# Patient Record
Sex: FEMALE | Race: WHITE | NOT HISPANIC OR LATINO
[De-identification: names, ages, dates, MRNs, and addresses within clinical notes are randomized per-mention and may not be internally consistent; named-entity substitution may affect disease eponyms.]

---

## 2017-05-03 ENCOUNTER — APPOINTMENT (OUTPATIENT)
Dept: SURGERY | Facility: CLINIC | Age: 72
End: 2017-05-03

## 2017-05-10 ENCOUNTER — APPOINTMENT (OUTPATIENT)
Dept: SURGERY | Facility: CLINIC | Age: 72
End: 2017-05-10

## 2017-06-15 ENCOUNTER — INPATIENT (INPATIENT)
Facility: HOSPITAL | Age: 72
LOS: 0 days | Discharge: HOME | End: 2017-06-16
Attending: HOSPITALIST | Admitting: HOSPITALIST
Payer: MEDICARE

## 2017-06-15 VITALS
HEART RATE: 71 BPM | RESPIRATION RATE: 17 BRPM | OXYGEN SATURATION: 96 % | DIASTOLIC BLOOD PRESSURE: 91 MMHG | TEMPERATURE: 98 F | SYSTOLIC BLOOD PRESSURE: 178 MMHG

## 2017-06-15 DIAGNOSIS — K59.09 OTHER CONSTIPATION: ICD-10-CM

## 2017-06-15 DIAGNOSIS — E03.9 HYPOTHYROIDISM, UNSPECIFIED: ICD-10-CM

## 2017-06-15 DIAGNOSIS — S12.100S: ICD-10-CM

## 2017-06-15 DIAGNOSIS — E83.52 HYPERCALCEMIA: ICD-10-CM

## 2017-06-15 DIAGNOSIS — I10 ESSENTIAL (PRIMARY) HYPERTENSION: ICD-10-CM

## 2017-06-15 DIAGNOSIS — M54.41 LUMBAGO WITH SCIATICA, RIGHT SIDE: ICD-10-CM

## 2017-06-15 LAB
ALBUMIN SERPL ELPH-MCNC: 3.9 G/DL — SIGNIFICANT CHANGE UP (ref 3.3–5)
ALP SERPL-CCNC: 94 U/L — SIGNIFICANT CHANGE UP (ref 40–120)
ALT FLD-CCNC: 16 U/L — SIGNIFICANT CHANGE UP (ref 4–33)
AST SERPL-CCNC: 31 U/L — SIGNIFICANT CHANGE UP (ref 4–32)
BASOPHILS # BLD AUTO: 0.04 K/UL — SIGNIFICANT CHANGE UP (ref 0–0.2)
BASOPHILS NFR BLD AUTO: 0.6 % — SIGNIFICANT CHANGE UP (ref 0–2)
BILIRUB SERPL-MCNC: 0.2 MG/DL — SIGNIFICANT CHANGE UP (ref 0.2–1.2)
BUN SERPL-MCNC: 48 MG/DL — HIGH (ref 7–23)
CA-I BLD-SCNC: 1.47 MMOL/L — HIGH (ref 1.03–1.23)
CALCIUM SERPL-MCNC: 12.4 MG/DL — HIGH (ref 8.4–10.5)
CHLORIDE SERPL-SCNC: 100 MMOL/L — SIGNIFICANT CHANGE UP (ref 98–107)
CO2 SERPL-SCNC: 28 MMOL/L — SIGNIFICANT CHANGE UP (ref 22–31)
CREAT SERPL-MCNC: 1.28 MG/DL — SIGNIFICANT CHANGE UP (ref 0.5–1.3)
EOSINOPHIL # BLD AUTO: 0.13 K/UL — SIGNIFICANT CHANGE UP (ref 0–0.5)
EOSINOPHIL NFR BLD AUTO: 1.8 % — SIGNIFICANT CHANGE UP (ref 0–6)
GLUCOSE SERPL-MCNC: 85 MG/DL — SIGNIFICANT CHANGE UP (ref 70–99)
HCT VFR BLD CALC: 34.6 % — SIGNIFICANT CHANGE UP (ref 34.5–45)
HGB BLD-MCNC: 11.1 G/DL — LOW (ref 11.5–15.5)
IMM GRANULOCYTES NFR BLD AUTO: 0 % — SIGNIFICANT CHANGE UP (ref 0–1.5)
LYMPHOCYTES # BLD AUTO: 2.38 K/UL — SIGNIFICANT CHANGE UP (ref 1–3.3)
LYMPHOCYTES # BLD AUTO: 33.7 % — SIGNIFICANT CHANGE UP (ref 13–44)
MCHC RBC-ENTMCNC: 28 PG — SIGNIFICANT CHANGE UP (ref 27–34)
MCHC RBC-ENTMCNC: 32.1 % — SIGNIFICANT CHANGE UP (ref 32–36)
MCV RBC AUTO: 87.4 FL — SIGNIFICANT CHANGE UP (ref 80–100)
MONOCYTES # BLD AUTO: 0.42 K/UL — SIGNIFICANT CHANGE UP (ref 0–0.9)
MONOCYTES NFR BLD AUTO: 5.9 % — SIGNIFICANT CHANGE UP (ref 2–14)
NEUTROPHILS # BLD AUTO: 4.09 K/UL — SIGNIFICANT CHANGE UP (ref 1.8–7.4)
NEUTROPHILS NFR BLD AUTO: 58 % — SIGNIFICANT CHANGE UP (ref 43–77)
PLATELET # BLD AUTO: 178 K/UL — SIGNIFICANT CHANGE UP (ref 150–400)
PMV BLD: 12.2 FL — SIGNIFICANT CHANGE UP (ref 7–13)
POTASSIUM SERPL-MCNC: 4.7 MMOL/L — SIGNIFICANT CHANGE UP (ref 3.5–5.3)
POTASSIUM SERPL-SCNC: 4.7 MMOL/L — SIGNIFICANT CHANGE UP (ref 3.5–5.3)
PROT SERPL-MCNC: 6.6 G/DL — SIGNIFICANT CHANGE UP (ref 6–8.3)
PTH-INTACT SERPL-MCNC: 10.17 PG/ML — LOW (ref 15–65)
RBC # BLD: 3.96 M/UL — SIGNIFICANT CHANGE UP (ref 3.8–5.2)
RBC # FLD: 14.6 % — HIGH (ref 10.3–14.5)
SODIUM SERPL-SCNC: 142 MMOL/L — SIGNIFICANT CHANGE UP (ref 135–145)
WBC # BLD: 7.06 K/UL — SIGNIFICANT CHANGE UP (ref 3.8–10.5)
WBC # FLD AUTO: 7.06 K/UL — SIGNIFICANT CHANGE UP (ref 3.8–10.5)

## 2017-06-15 PROCEDURE — 99223 1ST HOSP IP/OBS HIGH 75: CPT | Mod: AI

## 2017-06-15 RX ORDER — GABAPENTIN 400 MG/1
200 CAPSULE ORAL AT BEDTIME
Qty: 0 | Refills: 0 | Status: DISCONTINUED | OUTPATIENT
Start: 2017-06-15 | End: 2017-06-16

## 2017-06-15 RX ORDER — LEVOTHYROXINE SODIUM 125 MCG
88 TABLET ORAL DAILY
Qty: 0 | Refills: 0 | Status: DISCONTINUED | OUTPATIENT
Start: 2017-06-15 | End: 2017-06-16

## 2017-06-15 RX ORDER — BUPROPION HYDROCHLORIDE 150 MG/1
1 TABLET, EXTENDED RELEASE ORAL
Qty: 0 | Refills: 0 | COMMUNITY

## 2017-06-15 RX ORDER — HYDRALAZINE HCL 50 MG
10 TABLET ORAL EVERY 6 HOURS
Qty: 0 | Refills: 0 | Status: DISCONTINUED | OUTPATIENT
Start: 2017-06-15 | End: 2017-06-16

## 2017-06-15 RX ORDER — ACETAMINOPHEN 500 MG
650 TABLET ORAL ONCE
Qty: 0 | Refills: 0 | Status: COMPLETED | OUTPATIENT
Start: 2017-06-15 | End: 2017-06-15

## 2017-06-15 RX ORDER — SODIUM CHLORIDE 9 MG/ML
1000 INJECTION INTRAMUSCULAR; INTRAVENOUS; SUBCUTANEOUS ONCE
Qty: 0 | Refills: 0 | Status: COMPLETED | OUTPATIENT
Start: 2017-06-15 | End: 2017-06-15

## 2017-06-15 RX ORDER — GABAPENTIN 400 MG/1
2 CAPSULE ORAL
Qty: 0 | Refills: 0 | COMMUNITY

## 2017-06-15 RX ORDER — ALPRAZOLAM 0.25 MG
0.5 TABLET ORAL DAILY
Qty: 0 | Refills: 0 | Status: DISCONTINUED | OUTPATIENT
Start: 2017-06-15 | End: 2017-06-16

## 2017-06-15 RX ORDER — ENOXAPARIN SODIUM 100 MG/ML
40 INJECTION SUBCUTANEOUS EVERY 24 HOURS
Qty: 0 | Refills: 0 | Status: DISCONTINUED | OUTPATIENT
Start: 2017-06-15 | End: 2017-06-16

## 2017-06-15 RX ORDER — CALCITRIOL 0.5 UG/1
1 CAPSULE ORAL
Qty: 0 | Refills: 0 | COMMUNITY

## 2017-06-15 RX ORDER — SERTRALINE 25 MG/1
1 TABLET, FILM COATED ORAL
Qty: 0 | Refills: 0 | COMMUNITY

## 2017-06-15 RX ORDER — BUPROPION HYDROCHLORIDE 150 MG/1
150 TABLET, EXTENDED RELEASE ORAL DAILY
Qty: 0 | Refills: 0 | Status: DISCONTINUED | OUTPATIENT
Start: 2017-06-15 | End: 2017-06-16

## 2017-06-15 RX ORDER — LEVOTHYROXINE SODIUM 125 MCG
1 TABLET ORAL
Qty: 0 | Refills: 0 | COMMUNITY

## 2017-06-15 RX ORDER — ALPRAZOLAM 0.25 MG
1 TABLET ORAL
Qty: 0 | Refills: 0 | COMMUNITY

## 2017-06-15 RX ORDER — TERIPARATIDE 250 UG/ML
0 INJECTION, SOLUTION SUBCUTANEOUS
Qty: 0 | Refills: 0 | COMMUNITY

## 2017-06-15 RX ORDER — DIAZEPAM 5 MG
1 TABLET ORAL
Qty: 0 | Refills: 0 | COMMUNITY

## 2017-06-15 RX ORDER — CALCIUM CARBONATE 500(1250)
0 TABLET ORAL
Qty: 0 | Refills: 0 | COMMUNITY

## 2017-06-15 RX ORDER — SERTRALINE 25 MG/1
50 TABLET, FILM COATED ORAL DAILY
Qty: 0 | Refills: 0 | Status: DISCONTINUED | OUTPATIENT
Start: 2017-06-15 | End: 2017-06-16

## 2017-06-15 RX ORDER — SODIUM CHLORIDE 9 MG/ML
1000 INJECTION INTRAMUSCULAR; INTRAVENOUS; SUBCUTANEOUS
Qty: 0 | Refills: 0 | Status: DISCONTINUED | OUTPATIENT
Start: 2017-06-15 | End: 2017-06-16

## 2017-06-15 RX ORDER — CHOLECALCIFEROL (VITAMIN D3) 125 MCG
0 CAPSULE ORAL
Qty: 0 | Refills: 0 | COMMUNITY

## 2017-06-15 RX ADMIN — SODIUM CHLORIDE 125 MILLILITER(S): 9 INJECTION INTRAMUSCULAR; INTRAVENOUS; SUBCUTANEOUS at 18:05

## 2017-06-15 RX ADMIN — ENOXAPARIN SODIUM 40 MILLIGRAM(S): 100 INJECTION SUBCUTANEOUS at 21:38

## 2017-06-15 RX ADMIN — Medication 10 MILLIGRAM(S): at 18:41

## 2017-06-15 RX ADMIN — SODIUM CHLORIDE 1000 MILLILITER(S): 9 INJECTION INTRAMUSCULAR; INTRAVENOUS; SUBCUTANEOUS at 16:13

## 2017-06-15 RX ADMIN — GABAPENTIN 200 MILLIGRAM(S): 400 CAPSULE ORAL at 21:38

## 2017-06-15 RX ADMIN — SODIUM CHLORIDE 1000 MILLILITER(S): 9 INJECTION INTRAMUSCULAR; INTRAVENOUS; SUBCUTANEOUS at 14:30

## 2017-06-15 RX ADMIN — Medication 650 MILLIGRAM(S): at 18:05

## 2017-06-15 NOTE — ED PROVIDER NOTE - MEDICAL DECISION MAKING DETAILS
72 y/o female w/ lethargy, muscle weakness and slowed speech and elevated calcium on blood work yesterday- repeat labs, reassess

## 2017-06-15 NOTE — ED ADULT NURSE REASSESSMENT NOTE - NS ED NURSE REASSESS COMMENT FT1
Patient resting comfortably in stretcher with son at bedside. C/O constipation and headache. Last BM was on Sunday. Patient reports usual BM's are every 2 days and takes OTC stool softeners daily. Patient remains to be hypertensive. Medicated as per orders with tylenol, hydralazine and dulcolax suppository. Patient admitted to medicine for hypercalcemia. Awaiting call back for report to [facility]. Call bell within reach and son remains at bedside.

## 2017-06-15 NOTE — ED ADULT TRIAGE NOTE - CHIEF COMPLAINT QUOTE
as per pts son pt has been experiencing extreme fatigue and slurred speech. was told by her PMD that her calcium was 13.5. speech noted to be delayed in triage.  dr Martel called to triage for stroke eval. no code stroke called

## 2017-06-15 NOTE — ED PROVIDER NOTE - OBJECTIVE STATEMENT
70 y/o female pmh hypothyroidism, goiter, breast ca sent in by PMD for increased calcium levels. As per son, pt has had increased lethargy, RUE weakness and difficulty speaking. Pt saw PMD x1 day ago and found to have hypercalcemia and sent to ER. Admits to slowness to speech w/ 70 y/o female pmh hypothyroidism, goiter, breast ca sent in by PMD for increased calcium levels. As per son, pt has had increased lethargy, RUE weakness and difficulty speaking. Pt saw PMD x1 day ago and found to have hypercalcemia and sent to ER. Admits to slowness to speech, worse today. Denies chest pain, sob, abd pain, n/v/d, numbness, tingling, dizziness, syncope, fever or chills. Denies decreased appetite or new meds. Louise slurred speech, facial droop, change in vision.

## 2017-06-15 NOTE — H&P ADULT - PROBLEM SELECTOR PLAN 3
stable. continue Neurontin, and Tylenol. Avoid narcotics since patient has an intolerance to them. Patient for laminectomy next month.

## 2017-06-15 NOTE — ED PROVIDER NOTE - ATTENDING CONTRIBUTION TO CARE
Attending note:   After face to face evaluation of this patient, I concur with above noted hx, pe, and care plan for this patient. +Hypercalcemia; pt on forteo; evaluation in progress.   +Alert, sluggish

## 2017-06-15 NOTE — H&P ADULT - PMH
Breast Cancer    Hyperparathyroidism    Nodular Goiter Breast Cancer    Chronic midline low back pain with right-sided sciatica    Hyperparathyroidism    Hypothyroidism, unspecified type    Nodular Goiter    Odontoid fracture, sequela

## 2017-06-15 NOTE — H&P ADULT - PROBLEM SELECTOR PLAN 2
Patient has no history of hypertension. Son says BPs are normally low. Will not start standing right now. Start  Hydralazine IV prn and trend  Bps. If BPs still elevated in a.m. will consider standing BP meds.

## 2017-06-15 NOTE — ED ADULT NURSE REASSESSMENT NOTE - NS ED NURSE REASSESS COMMENT FT1
Patient resting comfortably in stretcher. Improvement in BP noted  after hydralazine. Report given to RN for RM 562h. Awaiting transport.

## 2017-06-15 NOTE — H&P ADULT - ASSESSMENT
71 y.o. 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

## 2017-06-15 NOTE — H&P ADULT - GASTROINTESTINAL DETAILS
no bruit/no masses palpable/no rigidity/nontender/no guarding/no distention/soft/bowel sounds normal

## 2017-06-15 NOTE — ED ADULT NURSE NOTE - OBJECTIVE STATEMENT
Patient received in RM 14 A&Ox3 and ambulatory at baseline. Sent in by PMD for elevated calcium (13.5). Patient was being treated with forteo. Patient c/o fatigue RUQ weakness and worsening hesitancy when speaking. Denies chest pain, SOB, dizziness, palpitations, slurred speech, numbness, tingling, blurry vision. PE and hisotry as noted below.

## 2017-06-15 NOTE — H&P ADULT - HISTORY OF PRESENT ILLNESS
71 y.o. 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 Patient also c/o constipation x 4 days without abdominal pain or N/V. She has chronic LBP with radiation down right leg which she says is chronically ~ 6/10 in severity controlled with valium and neurontin. She is scheduled for another laminectomy next month. In ED SBP as high as 180s. Patient and son deny history of hypertension.

## 2017-06-15 NOTE — H&P ADULT - PROBLEM SELECTOR PLAN 1
Most likely secondary to Forteo. recent mammogram few months ago negative according to son.   monitor ionized calcium and phosphorus.  check TSH, free T4, , SPEP/UPEP, Vitamin D levels.  start NS @125ml/hr

## 2017-06-16 ENCOUNTER — TRANSCRIPTION ENCOUNTER (OUTPATIENT)
Age: 72
End: 2017-06-16

## 2017-06-16 VITALS
DIASTOLIC BLOOD PRESSURE: 85 MMHG | TEMPERATURE: 98 F | SYSTOLIC BLOOD PRESSURE: 155 MMHG | RESPIRATION RATE: 18 BRPM | OXYGEN SATURATION: 100 % | HEART RATE: 78 BPM

## 2017-06-16 DIAGNOSIS — E21.3 HYPERPARATHYROIDISM, UNSPECIFIED: ICD-10-CM

## 2017-06-16 DIAGNOSIS — C50.919 MALIGNANT NEOPLASM OF UNSPECIFIED SITE OF UNSPECIFIED FEMALE BREAST: ICD-10-CM

## 2017-06-16 DIAGNOSIS — R47.81 SLURRED SPEECH: ICD-10-CM

## 2017-06-16 LAB
24R-OH-CALCIDIOL SERPL-MCNC: 49.1 NG/ML — SIGNIFICANT CHANGE UP (ref 30–100)
ALBUMIN SERPL ELPH-MCNC: 3.2 G/DL — LOW (ref 3.3–5)
ALP SERPL-CCNC: 73 U/L — SIGNIFICANT CHANGE UP (ref 40–120)
ALT FLD-CCNC: 13 U/L — SIGNIFICANT CHANGE UP (ref 4–33)
AST SERPL-CCNC: 23 U/L — SIGNIFICANT CHANGE UP (ref 4–32)
BASOPHILS # BLD AUTO: 0.04 K/UL — SIGNIFICANT CHANGE UP (ref 0–0.2)
BASOPHILS NFR BLD AUTO: 0.6 % — SIGNIFICANT CHANGE UP (ref 0–2)
BILIRUB SERPL-MCNC: 0.2 MG/DL — SIGNIFICANT CHANGE UP (ref 0.2–1.2)
BUN SERPL-MCNC: 32 MG/DL — HIGH (ref 7–23)
CA-I BLD-SCNC: 1.36 MMOL/L — HIGH (ref 1.03–1.23)
CALCIUM SERPL-MCNC: 10.6 MG/DL — HIGH (ref 8.4–10.5)
CHLORIDE SERPL-SCNC: 109 MMOL/L — HIGH (ref 98–107)
CO2 SERPL-SCNC: 24 MMOL/L — SIGNIFICANT CHANGE UP (ref 22–31)
CREAT SERPL-MCNC: 1.02 MG/DL — SIGNIFICANT CHANGE UP (ref 0.5–1.3)
EOSINOPHIL # BLD AUTO: 0.33 K/UL — SIGNIFICANT CHANGE UP (ref 0–0.5)
EOSINOPHIL NFR BLD AUTO: 5.2 % — SIGNIFICANT CHANGE UP (ref 0–6)
GLUCOSE SERPL-MCNC: 84 MG/DL — SIGNIFICANT CHANGE UP (ref 70–99)
HCT VFR BLD CALC: 31.3 % — LOW (ref 34.5–45)
HGB BLD-MCNC: 9.8 G/DL — LOW (ref 11.5–15.5)
IMM GRANULOCYTES NFR BLD AUTO: 0.3 % — SIGNIFICANT CHANGE UP (ref 0–1.5)
LYMPHOCYTES # BLD AUTO: 2.91 K/UL — SIGNIFICANT CHANGE UP (ref 1–3.3)
LYMPHOCYTES # BLD AUTO: 45.8 % — HIGH (ref 13–44)
MAGNESIUM SERPL-MCNC: 1.5 MG/DL — LOW (ref 1.6–2.6)
MCHC RBC-ENTMCNC: 27.5 PG — SIGNIFICANT CHANGE UP (ref 27–34)
MCHC RBC-ENTMCNC: 31.3 % — LOW (ref 32–36)
MCV RBC AUTO: 87.7 FL — SIGNIFICANT CHANGE UP (ref 80–100)
MONOCYTES # BLD AUTO: 0.47 K/UL — SIGNIFICANT CHANGE UP (ref 0–0.9)
MONOCYTES NFR BLD AUTO: 7.4 % — SIGNIFICANT CHANGE UP (ref 2–14)
NEUTROPHILS # BLD AUTO: 2.58 K/UL — SIGNIFICANT CHANGE UP (ref 1.8–7.4)
NEUTROPHILS NFR BLD AUTO: 40.7 % — LOW (ref 43–77)
PHOSPHATE SERPL-MCNC: 3.6 MG/DL — SIGNIFICANT CHANGE UP (ref 2.5–4.5)
PLATELET # BLD AUTO: 157 K/UL — SIGNIFICANT CHANGE UP (ref 150–400)
PMV BLD: 12.1 FL — SIGNIFICANT CHANGE UP (ref 7–13)
POTASSIUM SERPL-MCNC: 3.8 MMOL/L — SIGNIFICANT CHANGE UP (ref 3.5–5.3)
POTASSIUM SERPL-SCNC: 3.8 MMOL/L — SIGNIFICANT CHANGE UP (ref 3.5–5.3)
PROT SERPL-MCNC: 5.3 G/DL — LOW (ref 6–8.3)
PROT UR-MCNC: 3.8 MG/DL — SIGNIFICANT CHANGE UP
RBC # BLD: 3.57 M/UL — LOW (ref 3.8–5.2)
RBC # FLD: 15.1 % — HIGH (ref 10.3–14.5)
SODIUM SERPL-SCNC: 147 MMOL/L — HIGH (ref 135–145)
T4 FREE SERPL-MCNC: 0.99 NG/DL — SIGNIFICANT CHANGE UP (ref 0.9–1.8)
TSH SERPL-MCNC: 4.14 UIU/ML — SIGNIFICANT CHANGE UP (ref 0.27–4.2)
VIT D25+D1,25 OH+D1,25 PNL SERPL-MCNC: 14.7 PG/ML — LOW (ref 19.9–79.3)
WBC # BLD: 6.35 K/UL — SIGNIFICANT CHANGE UP (ref 3.8–10.5)
WBC # FLD AUTO: 6.35 K/UL — SIGNIFICANT CHANGE UP (ref 3.8–10.5)

## 2017-06-16 PROCEDURE — 70450 CT HEAD/BRAIN W/O DYE: CPT | Mod: 26,59

## 2017-06-16 PROCEDURE — 70498 CT ANGIOGRAPHY NECK: CPT | Mod: 26,52

## 2017-06-16 PROCEDURE — 84166 PROTEIN E-PHORESIS/URINE/CSF: CPT | Mod: 26

## 2017-06-16 PROCEDURE — 93010 ELECTROCARDIOGRAM REPORT: CPT

## 2017-06-16 PROCEDURE — 84165 PROTEIN E-PHORESIS SERUM: CPT | Mod: 26

## 2017-06-16 PROCEDURE — 70496 CT ANGIOGRAPHY HEAD: CPT | Mod: 26

## 2017-06-16 RX ORDER — ALPRAZOLAM 0.25 MG
1 TABLET ORAL
Qty: 0 | Refills: 0 | COMMUNITY
Start: 2017-06-16

## 2017-06-16 RX ORDER — ASPIRIN/CALCIUM CARB/MAGNESIUM 324 MG
81 TABLET ORAL DAILY
Qty: 0 | Refills: 0 | Status: DISCONTINUED | OUTPATIENT
Start: 2017-06-16 | End: 2017-06-16

## 2017-06-16 RX ORDER — ATORVASTATIN CALCIUM 80 MG/1
1 TABLET, FILM COATED ORAL
Qty: 0 | Refills: 0 | COMMUNITY
Start: 2017-06-16

## 2017-06-16 RX ORDER — ATORVASTATIN CALCIUM 80 MG/1
80 TABLET, FILM COATED ORAL AT BEDTIME
Qty: 0 | Refills: 0 | Status: DISCONTINUED | OUTPATIENT
Start: 2017-06-16 | End: 2017-06-16

## 2017-06-16 RX ORDER — ACETAMINOPHEN 500 MG
650 TABLET ORAL ONCE
Qty: 0 | Refills: 0 | Status: COMPLETED | OUTPATIENT
Start: 2017-06-16 | End: 2017-06-16

## 2017-06-16 RX ORDER — ASPIRIN/CALCIUM CARB/MAGNESIUM 324 MG
1 TABLET ORAL
Qty: 0 | Refills: 0 | COMMUNITY
Start: 2017-06-16

## 2017-06-16 RX ORDER — ATORVASTATIN CALCIUM 80 MG/1
1 TABLET, FILM COATED ORAL
Qty: 30 | Refills: 0 | OUTPATIENT
Start: 2017-06-16 | End: 2017-07-16

## 2017-06-16 RX ORDER — SERTRALINE 25 MG/1
1 TABLET, FILM COATED ORAL
Qty: 0 | Refills: 0 | COMMUNITY

## 2017-06-16 RX ORDER — SODIUM CHLORIDE 9 MG/ML
1000 INJECTION, SOLUTION INTRAVENOUS
Qty: 0 | Refills: 0 | Status: DISCONTINUED | OUTPATIENT
Start: 2017-06-16 | End: 2017-06-16

## 2017-06-16 RX ORDER — ASPIRIN/CALCIUM CARB/MAGNESIUM 324 MG
1 TABLET ORAL
Qty: 30 | Refills: 0 | OUTPATIENT
Start: 2017-06-16 | End: 2017-07-16

## 2017-06-16 RX ADMIN — Medication 650 MILLIGRAM(S): at 08:53

## 2017-06-16 RX ADMIN — Medication 81 MILLIGRAM(S): at 16:41

## 2017-06-16 RX ADMIN — SODIUM CHLORIDE 125 MILLILITER(S): 9 INJECTION, SOLUTION INTRAVENOUS at 07:41

## 2017-06-16 RX ADMIN — Medication 88 MICROGRAM(S): at 05:18

## 2017-06-16 NOTE — PHYSICAL THERAPY INITIAL EVALUATION ADULT - PERTINENT HX OF CURRENT PROBLEM, REHAB EVAL
71 y.o. 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 Patient also c/o constipation x 4 days without abdominal pain or N/V.

## 2017-06-16 NOTE — DISCHARGE NOTE ADULT - CARE PLAN
Principal Discharge DX:	Hypercalcemia  Goal:	Improved  Instructions for follow-up, activity and diet:	Likely secondary to Forteo. Improved with IVF to 10.6  Secondary Diagnosis:	Breast Cancer  Instructions for follow-up, activity and diet:	Recent mammogram few months ago negative (according to son)  Secondary Diagnosis:	Hyperparathyroidism  Secondary Diagnosis:	Hypothyroidism, unspecified type  Instructions for follow-up, activity and diet:	continue synthroid  Secondary Diagnosis:	Odontoid fracture, sequela  Instructions for follow-up, activity and diet:	Stable  Secondary Diagnosis:	Slurred speech  Instructions for follow-up, activity and diet:	Neurology consulted and recommended MRI/MRA. Patient  Secondary Diagnosis:	Chronic midline low back pain with right-sided sciatica  Instructions for follow-up, activity and diet:	Continue Neurontin, and Tylenol. Laminectomy scheduled next month Principal Discharge DX:	Hypercalcemia  Goal:	Improved  Instructions for follow-up, activity and diet:	Likely secondary to Forteo. Improved with IVF to 10.6  Secondary Diagnosis:	Breast Cancer  Instructions for follow-up, activity and diet:	Recent mammogram few months ago negative (according to son)  Secondary Diagnosis:	Hyperparathyroidism  Secondary Diagnosis:	Hypothyroidism, unspecified type  Instructions for follow-up, activity and diet:	continue synthroid  Secondary Diagnosis:	Odontoid fracture, sequela  Instructions for follow-up, activity and diet:	Stable  Secondary Diagnosis:	Slurred speech  Instructions for follow-up, activity and diet:	Neurology consulted and recommended MRI/MRA. Patient wished for this as outpt  Secondary Diagnosis:	Chronic midline low back pain with right-sided sciatica  Instructions for follow-up, activity and diet:	Continue Neurontin, and Tylenol. Laminectomy scheduled next month

## 2017-06-16 NOTE — DISCHARGE NOTE ADULT - PATIENT PORTAL LINK FT
“You can access the [facility] Patient Portal, offered by [facility], by registering with the following website: [email or web address]”

## 2017-06-16 NOTE — PROGRESS NOTE ADULT - ATTENDING COMMENTS
Message left in my office by son Johny, [phone]. Called back but no answer, unable to leave a message

## 2017-06-16 NOTE — CONSULT NOTE ADULT - ASSESSMENT
70 y/o F p/w generalized weakness in setting of hypercalcemia w/ transient episodic slurred speech w/ no associated symptoms. Etiology could be 2/2 to metabolic derangements ,however TIA also high on the differential. Patient currently back to baseline, very adamant she wants to go home and refusing further testing. Explained to her the possibility of her symptoms being a TIA ("mini stroke") and she is at a higher risk to develop a stroke which would lead to permanent disability. Patient understands and states her son is a neurosurgeon, she is going home regardless and will follow up on Monday.   Plan:   - MRI brain, MRA Head without contrast, MRA Neck w/ contrast if patient willing to stay   - if patient refusing above, please have CTA H/N done prior to discharge   - if CTA H/N unremarkable and patient refusing to stay, please discharge on ASA 81 mg qd and Lipitor 80 mg qhs and have her follow up with Dr. Nissenbaum or Dr. Broderick on Monday ([phone])

## 2017-06-16 NOTE — DISCHARGE NOTE ADULT - SECONDARY DIAGNOSIS.
Breast Cancer Hyperparathyroidism Hypothyroidism, unspecified type Odontoid fracture, sequela Slurred speech Chronic midline low back pain with right-sided sciatica

## 2017-06-16 NOTE — PROGRESS NOTE ADULT - ASSESSMENT
72 yo female with ho breast ca, hyperthyroidism s/p resection , hyperparathyroidism a/w symptomatic hyercalcemia and slurred speech

## 2017-06-16 NOTE — PROGRESS NOTE ADULT - SUBJECTIVE AND OBJECTIVE BOX
Patient is a 71y old  Female who presents with symptomatic hypocalcemia      SUBJECTIVE / OVERNIGHT EVENTS: speech is better, wants to go home    MEDICATIONS  (STANDING):  enoxaparin Injectable 40milliGRAM(s) SubCutaneous every 24 hours  gabapentin 200milliGRAM(s) Oral at bedtime  levothyroxine 88MICROGram(s) Oral daily  buPROPion XL . 150milliGRAM(s) Oral daily  sertraline 50milliGRAM(s) Oral daily  sodium chloride 0.45%. 1000milliLiter(s) IV Continuous <Continuous>    MEDICATIONS  (PRN):  hydrALAZINE Injectable 10milliGRAM(s) IV Push every 6 hours PRN HTN  bisacodyl Suppository 10milliGRAM(s) Rectal daily PRN Constipation  ALPRAZolam 0.5milliGRAM(s) Oral daily PRN anxiety      Vital Signs Last 24 Hrs  T(C): 36.5, Max: 36.7 (06-15 @ 17:35)  HR: 70 (67 - 82)  BP: 145/87 (130/52 - 183/87)  RR: 16 (15 - 16)  SpO2: 95% (95% - 100%)        PHYSICAL EXAM:  GENERAL: NAD, well-developed  HEAD:  Atraumatic, Normocephalic  EYES: EOMI, PERRLA, conjunctiva and sclera clear  CHEST/LUNG: b/l AE  HEART: Regular rate and rhythm; No murmurs, rubs, or gallops  ABDOMEN: Soft, Nontender, Nondistended; Bowel sounds present  EXTREMITIES:  No clubbing, cyanosis, or edema  PSYCH: AAOx3  NEUROLOGY: speech fluent      LABS:                        9.8    6.35  )-----------( 157      ( 16 Jun 2017 04:45 )             31.3     06-16    147<H>  |  109<H>  |  32<H>  ----------------------------<  84  3.8   |  24  |  1.02    Ca    10.6<H>      16 Jun 2017 04:45  Phos  3.6     06-16  Mg     1.5     06-16    TPro  5.3<L>  /  Alb  3.2<L>  /  TBili  0.2  /  DBili  x   /  AST  23  /  ALT  13  /  AlkPhos  73  06-16

## 2017-06-16 NOTE — DISCHARGE NOTE ADULT - PROVIDER TOKENS
TOKEN:'[id number]',FREE:[LAST:[Primary Care provider],PHONE:[(   )    -],FAX:[(   )    -],ADDRESS:[Please follow with PMD in 1-2 weeks]]

## 2017-06-16 NOTE — DISCHARGE NOTE ADULT - HOSPITAL COURSE
70 y/o female pmh hypothyroidism, goiter, breast CA sent in by PMD for increased calcium levels. As per son, pt has had increased lethargy, RUE weakness and difficulty speaking. Pt saw PMD x1 day ago and found to have hypercalcemia and sent to ER. Admits to slowness to speech, worse today.  Hypercalcemia (likely secondary to Forteo)  -recent mammogram few months ago negative (according to son)          Uncontrolled hypertension  Stable,  Hydralazine IV PRN   Follow up with PCP. Patient may need to     Chronic midline low back pain with right-sided sciatica.    Stable. Continue Neurontin, and Tylenol.   .       Constipation.     Start Dulcolax suppository. Patient can't tolerate Miralax.       Hypothyroidism  continue synthroid.     Odontoid fracture  stable. Son at bedside is a neurosurgeon and assures its been evaluated and stable.    Difficult in speech   -CT head negative, CTA ordered. If negative follow up with Dr Perez outpatient on Monday 70 y/o female pmh hypothyroidism, goiter, breast CA sent in by PMD for increased calcium levels. As per son, pt has had increased lethargy, RUE weakness and difficulty speaking. Pt saw PMD x1 day ago and found to have hypercalcemia and sent to ER. Admits to slowness to speech, worse today.  Hypercalcemia (likely secondary to Forteo)  -recent mammogram few months ago negative (according to son)          Uncontrolled hypertension  Stable,  Hydralazine IV PRN   Follow up with PCP. Patient may need to     Chronic midline low back pain with right-sided sciatica.    Stable. Continue Neurontin, and Tylenol.   .       Constipation.     Start Dulcolax suppository. Patient can't tolerate Miralax.       Hypothyroidism  continue synthroid.     Odontoid fracture  stable. Son at bedside is a neurosurgeon and assures its been evaluated and stable.    Difficult in speech   -CT head negative, CTA ordered. If negative follow up with Dr Perez outpatient on Monday    CTA Head and neck negative as per preliminary reports.  Results d/w Neurology resident on call ([id number]).  Patient will need to follow up with Neurology on Monday, started on lipitor 40mg PO and Aspirin 81mg 70 y/o female pmh hypothyroidism, goiter, breast CA sent in by PMD for increased calcium levels. As per son, pt has had increased lethargy, RUE weakness and difficulty speaking. Pt saw PMD x1 day ago and found to have hypercalcemia and sent to ER. Admits to slowness to speech, worse today.  Hypercalcemia (likely secondary to Forteo)  -recent mammogram few months ago negative (according to son)        new hypertension?  Stable,  Hydralazine IV PRN   Follow up with PCP. Patient may need to be addressed as outpt    Chronic midline low back pain with right-sided sciatica.    Stable. Continue Neurontin, and Tylenol.   .       Constipation.     Start Dulcolax suppository. Patient can't tolerate Miralax.       Hypothyroidism  continue synthroid.     Odontoid fracture  stable. Son at bedside is a neurosurgeon and assures its been evaluated and stable.    Difficult in speech   -CT head negative, CTA neg.  follow up with Dr Perez outpatient on Monday    CTA Head and neck negative as per preliminary reports.  Results d/w Neurology resident on call ([id number]).  Patient will need to follow up with Neurology on Monday, started on lipitor 40mg PO and Aspirin 81mg

## 2017-06-16 NOTE — DISCHARGE NOTE ADULT - PLAN OF CARE
Neurology consulted and recommended MRI/MRA. Patient continue synthroid Stable Likely secondary to Forteo. Improved with IVF to 10.6 Recent mammogram few months ago negative (according to son) Improved Continue Neurontin, and Tylenol. Laminectomy scheduled next month Neurology consulted and recommended MRI/MRA. Patient wished for this as outpt

## 2017-06-16 NOTE — DISCHARGE NOTE ADULT - CARE PROVIDER_API CALL
Nissenbaum, Michael A (MD), Neurology  [address]  [place] [address]  Phone: [phone]  Fax: [phone]    Primary Care provider,   Please follow with PMD in 1-2 weeks  Phone: (   )    -  Fax: (   )    -

## 2017-06-16 NOTE — PROVIDER CONTACT NOTE (MEDICATION) - ASSESSMENT
Hx states allergy to Percocet. Discussed reaction to pt. She reports itching and hallucination. Pt states she takes Tylenol by itself often at home and has received it in the hospital. Neg reaction. Pt requesting Tylenol for headache.

## 2017-06-16 NOTE — CONSULT NOTE ADULT - SUBJECTIVE AND OBJECTIVE BOX
NEUROLOGY CONSULT     Patient is a 71y old  Female who presents with a chief complaint of weakness, hypercalcemia (15 [place] 2017 17:35)      HPI:  71 y.o. 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 Neurology consulted for transient slurred speech. Patient states that on 6/13 had slurred speech that lasted for a couple of hours with no other associated symptoms, resolved and patient back to baseline. This AM, noted by team and son to have dysarthria, with no associated symptoms which resolved within minutes-hour. Patient denies weakness, sensory changes, visual changes, dysphagia. Very adamant she wants to go home.     PAST MEDICAL & SURGICAL HISTORY:  Odontoid fracture, sequela  Chronic midline low back pain with right-sided sciatica  Hypothyroidism, unspecified type  Nodular Goiter  Hyperparathyroidism  Breast Cancer  S/P Arthroscopy right shoulder  History of Mastectomy  and reconstruction right 8/09      Allergies    flu shot (Other)  Percocet 10/325 (Other)  Ultram (Other)      MEDICATIONS  (STANDING):  enoxaparin Injectable 40milliGRAM(s) SubCutaneous every 24 hours  gabapentin 200milliGRAM(s) Oral at bedtime  levothyroxine 88MICROGram(s) Oral daily  buPROPion XL . 150milliGRAM(s) Oral daily  sertraline 50milliGRAM(s) Oral daily  sodium chloride 0.45%. 1000milliLiter(s) IV Continuous <Continuous>  aspirin  chewable 81milliGRAM(s) Oral daily  atorvastatin 80milliGRAM(s) Oral at bedtime    MEDICATIONS  (PRN):  hydrALAZINE Injectable 10milliGRAM(s) IV Push every 6 hours PRN HTN  bisacodyl Suppository 10milliGRAM(s) Rectal daily PRN Constipation  ALPRAZolam 0.5milliGRAM(s) Oral daily PRN anxiety      SOCIAL HISTORY: Denies tobacco/EtOH/drug use     FAMILY HISTORY:  No pertinent family history in first degree relatives      REVIEW OF SYSTEMS:  CONSTITUTIONAL:  No weight loss, fever, chills, weakness or fatigue.  HEENT:  Eyes:  No visual loss, blurred vision, double vision or yellow sclerae. Ears, Nose, Throat:  No hearing loss, sneezing, congestion, runny nose or sore throat.  SKIN:  No rash or itching.  CARDIOVASCULAR:  No chest pain, chest pressure or chest discomfort. No palpitations or edema.  RESPIRATORY:  No shortness of breath, cough or sputum.  GASTROINTESTINAL:  No anorexia, nausea, vomiting or diarrhea. No abdominal pain or blood.  GENITOURINARY:  denies incontinence/retention   NEUROLOGICAL:  Slurred speech (transient) No headache, dizziness, syncope, paralysis, ataxia, numbness or tingling in the extremities. No change in bowel or bladder control.  MUSCULOSKELETAL:  No muscle, back pain, joint pain or stiffness.  HEMATOLOGIC:  No anemia, bleeding or bruising.  LYMPHATICS:  No enlarged nodes. No history of splenectomy.  PSYCHIATRIC:  No history of depression or anxiety.  ENDOCRINOLOGIC:  No reports of sweating, cold or heat intolerance. No polyuria or polydipsia.      Vital Signs Last 24 Hrs  T(C): 36.3, Max: 36.7 (06-15 @ 17:35)  T(F): 97.4, Max: 98 (06-15 @ 17:35)  HR: 79 (67 - 82)  BP: 122/65 (122/65 - 183/87)  BP(mean): 119 (119 - 119)  RR: 18 (15 - 18)  SpO2: 98% (95% - 100%)    PHYSICAL EXAM:   General appearance: No acute distress                 Mental Status: AAOx3, fluent speech, follows simple commands, able to name  Cranial Nerves: EOMI, PERRL, VFF, V1-V3 intact, facial symmetric,  tongue midline  Motor: strength 5/5 throughout. No drift x4  Sensation: Intact to LT throughout  Coordination: FTN intact b/l    LABS:                        9.8    6.35  )-----------( 157      ( 16 Jun 2017 04:45 )             31.3     06-16    147<H>  |  109<H>  |  32<H>  ----------------------------<  84  3.8   |  24  |  1.02    Ca    10.6<H>      16 Jun 2017 04:45  Phos  3.6     06-16  Mg     1.5     06-16    TPro  5.3<L>  /  Alb  3.2<L>  /  TBili  0.2  /  DBili  x   /  AST  23  /  ALT  13  /  AlkPhos  73  06-16      IMAGING:  CTH:   No acute intracranial pathology is noted.

## 2017-06-16 NOTE — DISCHARGE NOTE ADULT - MEDICATION SUMMARY - MEDICATIONS TO TAKE
I will START or STAY ON the medications listed below when I get home from the hospital:    aspirin 81 mg oral tablet, chewable  -- 1 tab(s) by mouth once a day  -- Indication: For PPX    calcium carbonate  --  by mouth 2 times a day  -- Indication: For Hypercalcemia due to a drug    gabapentin 100 mg oral capsule  -- 2 cap(s) by mouth once a day (at bedtime)  -- Indication: For Hypercalcemia    sertraline 50 mg oral tablet  -- 1 tab(s) by mouth once a day  -- Indication: For Depression    atorvastatin 80 mg oral tablet  -- 1 tab(s) by mouth once a day (at bedtime)  -- Indication: For HLD    ALPRAZolam 0.5 mg oral tablet  -- 1 tab(s) by mouth once a day, As needed, anxiety  -- Indication: For Anxiety    ALPRAZolam 0.5 mg oral tablet  -- 1 tab(s) by mouth once a day  -- Indication: For Anxiety    Forteo  --  subcutaneous   -- Indication: For Hyperparathyroidism    buPROPion 150 mg/24 hours (XL) oral tablet, extended release  -- 1 tab(s) by mouth every 24 hours  -- Indication: For Depression    Synthroid 88 mcg (0.088 mg) oral tablet  -- 1 tab(s) by mouth once a day  -- Indication: For Hypothyroidism, unspecified type    multivitamin  --     -- Indication: For PPX    Vitamin D3  --  by mouth   -- Indication: For PPX    calcitriol 0.25 mcg oral capsule  -- 1 cap(s) by mouth once a day  -- Indication: For PPX

## 2017-06-19 LAB
GAS PNL BLDMV: SIGNIFICANT CHANGE UP
GAS PNL BLDMV: SIGNIFICANT CHANGE UP

## 2017-06-26 ENCOUNTER — APPOINTMENT (OUTPATIENT)
Dept: PSYCHIATRY | Facility: CLINIC | Age: 72
End: 2017-06-26

## 2017-07-08 ENCOUNTER — EMERGENCY (EMERGENCY)
Facility: HOSPITAL | Age: 72
LOS: 1 days | Discharge: HOME | End: 2017-07-08
Attending: EMERGENCY MEDICINE | Admitting: EMERGENCY MEDICINE
Payer: MEDICARE

## 2017-07-08 VITALS
RESPIRATION RATE: 18 BRPM | OXYGEN SATURATION: 100 % | DIASTOLIC BLOOD PRESSURE: 56 MMHG | SYSTOLIC BLOOD PRESSURE: 125 MMHG | HEART RATE: 66 BPM

## 2017-07-08 VITALS
DIASTOLIC BLOOD PRESSURE: 83 MMHG | RESPIRATION RATE: 18 BRPM | HEART RATE: 71 BPM | SYSTOLIC BLOOD PRESSURE: 149 MMHG | OXYGEN SATURATION: 100 %

## 2017-07-08 LAB
ALBUMIN SERPL ELPH-MCNC: 4.1 G/DL — SIGNIFICANT CHANGE UP (ref 3.3–5)
ALP SERPL-CCNC: 112 U/L — SIGNIFICANT CHANGE UP (ref 40–120)
ALT FLD-CCNC: 13 U/L — SIGNIFICANT CHANGE UP (ref 4–33)
AST SERPL-CCNC: 26 U/L — SIGNIFICANT CHANGE UP (ref 4–32)
BASE EXCESS BLDV CALC-SCNC: 3.4 MMOL/L — SIGNIFICANT CHANGE UP
BASOPHILS # BLD AUTO: 0.07 K/UL — SIGNIFICANT CHANGE UP (ref 0–0.2)
BASOPHILS NFR BLD AUTO: 0.8 % — SIGNIFICANT CHANGE UP (ref 0–2)
BILIRUB SERPL-MCNC: 0.2 MG/DL — SIGNIFICANT CHANGE UP (ref 0.2–1.2)
BLOOD GAS VENOUS - CREATININE: 0.87 MG/DL — SIGNIFICANT CHANGE UP (ref 0.5–1.3)
BUN SERPL-MCNC: 24 MG/DL — HIGH (ref 7–23)
CALCIUM SERPL-MCNC: 7.7 MG/DL — LOW (ref 8.4–10.5)
CHLORIDE BLDV-SCNC: 103 MMOL/L — SIGNIFICANT CHANGE UP (ref 96–108)
CHLORIDE SERPL-SCNC: 101 MMOL/L — SIGNIFICANT CHANGE UP (ref 98–107)
CO2 SERPL-SCNC: 25 MMOL/L — SIGNIFICANT CHANGE UP (ref 22–31)
CREAT SERPL-MCNC: 0.93 MG/DL — SIGNIFICANT CHANGE UP (ref 0.5–1.3)
EOSINOPHIL # BLD AUTO: 0.2 K/UL — SIGNIFICANT CHANGE UP (ref 0–0.5)
EOSINOPHIL NFR BLD AUTO: 2.4 % — SIGNIFICANT CHANGE UP (ref 0–6)
GAS PNL BLDV: 141 MMOL/L — SIGNIFICANT CHANGE UP (ref 136–146)
GLUCOSE BLDV-MCNC: 103 — HIGH (ref 70–99)
GLUCOSE SERPL-MCNC: 99 MG/DL — SIGNIFICANT CHANGE UP (ref 70–99)
HCO3 BLDV-SCNC: 26 MMOL/L — SIGNIFICANT CHANGE UP (ref 20–27)
HCT VFR BLD CALC: 36.2 % — SIGNIFICANT CHANGE UP (ref 34.5–45)
HCT VFR BLDV CALC: 36.4 % — SIGNIFICANT CHANGE UP (ref 34.5–45)
HGB BLD-MCNC: 12.2 G/DL — SIGNIFICANT CHANGE UP (ref 11.5–15.5)
HGB BLDV-MCNC: 11.8 G/DL — SIGNIFICANT CHANGE UP (ref 11.5–15.5)
IMM GRANULOCYTES # BLD AUTO: 0.03 # — SIGNIFICANT CHANGE UP
IMM GRANULOCYTES NFR BLD AUTO: 0.4 % — SIGNIFICANT CHANGE UP (ref 0–1.5)
LACTATE BLDV-MCNC: 2.4 MMOL/L — HIGH (ref 0.5–2)
LYMPHOCYTES # BLD AUTO: 1.89 K/UL — SIGNIFICANT CHANGE UP (ref 1–3.3)
LYMPHOCYTES # BLD AUTO: 22.6 % — SIGNIFICANT CHANGE UP (ref 13–44)
MCHC RBC-ENTMCNC: 28.9 PG — SIGNIFICANT CHANGE UP (ref 27–34)
MCHC RBC-ENTMCNC: 33.7 % — SIGNIFICANT CHANGE UP (ref 32–36)
MCV RBC AUTO: 85.8 FL — SIGNIFICANT CHANGE UP (ref 80–100)
MONOCYTES # BLD AUTO: 0.51 K/UL — SIGNIFICANT CHANGE UP (ref 0–0.9)
MONOCYTES NFR BLD AUTO: 6.1 % — SIGNIFICANT CHANGE UP (ref 2–14)
NEUTROPHILS # BLD AUTO: 5.68 K/UL — SIGNIFICANT CHANGE UP (ref 1.8–7.4)
NEUTROPHILS NFR BLD AUTO: 67.7 % — SIGNIFICANT CHANGE UP (ref 43–77)
NRBC # FLD: 0 — SIGNIFICANT CHANGE UP
PCO2 BLDV: 48 MMHG — SIGNIFICANT CHANGE UP (ref 41–51)
PH BLDV: 7.39 PH — SIGNIFICANT CHANGE UP (ref 7.32–7.43)
PLATELET # BLD AUTO: 166 K/UL — SIGNIFICANT CHANGE UP (ref 150–400)
PMV BLD: 11.8 FL — SIGNIFICANT CHANGE UP (ref 7–13)
PO2 BLDV: 39 MMHG — SIGNIFICANT CHANGE UP (ref 35–40)
POTASSIUM BLDV-SCNC: 3.9 MMOL/L — SIGNIFICANT CHANGE UP (ref 3.4–4.5)
POTASSIUM SERPL-MCNC: 4.1 MMOL/L — SIGNIFICANT CHANGE UP (ref 3.5–5.3)
POTASSIUM SERPL-SCNC: 4.1 MMOL/L — SIGNIFICANT CHANGE UP (ref 3.5–5.3)
PROT SERPL-MCNC: 6.8 G/DL — SIGNIFICANT CHANGE UP (ref 6–8.3)
RBC # BLD: 4.22 M/UL — SIGNIFICANT CHANGE UP (ref 3.8–5.2)
RBC # FLD: 14.3 % — SIGNIFICANT CHANGE UP (ref 10.3–14.5)
SAO2 % BLDV: 66.7 % — SIGNIFICANT CHANGE UP (ref 60–85)
SODIUM SERPL-SCNC: 142 MMOL/L — SIGNIFICANT CHANGE UP (ref 135–145)
TSH SERPL-MCNC: 2.79 UIU/ML — SIGNIFICANT CHANGE UP (ref 0.27–4.2)
WBC # BLD: 8.38 K/UL — SIGNIFICANT CHANGE UP (ref 3.8–10.5)
WBC # FLD AUTO: 8.38 K/UL — SIGNIFICANT CHANGE UP (ref 3.8–10.5)

## 2017-07-08 PROCEDURE — 93010 ELECTROCARDIOGRAM REPORT: CPT

## 2017-07-08 PROCEDURE — 70450 CT HEAD/BRAIN W/O DYE: CPT | Mod: 26

## 2017-07-08 PROCEDURE — 99285 EMERGENCY DEPT VISIT HI MDM: CPT | Mod: 25

## 2017-07-08 RX ORDER — METOCLOPRAMIDE HCL 10 MG
10 TABLET ORAL ONCE
Qty: 0 | Refills: 0 | Status: COMPLETED | OUTPATIENT
Start: 2017-07-08 | End: 2017-07-08

## 2017-07-08 RX ORDER — FAMOTIDINE 10 MG/ML
20 INJECTION INTRAVENOUS DAILY
Qty: 0 | Refills: 0 | Status: DISCONTINUED | OUTPATIENT
Start: 2017-07-08 | End: 2017-07-12

## 2017-07-08 RX ORDER — ACETAMINOPHEN 500 MG
1000 TABLET ORAL ONCE
Qty: 0 | Refills: 0 | Status: COMPLETED | OUTPATIENT
Start: 2017-07-08 | End: 2017-07-08

## 2017-07-08 RX ORDER — MECLIZINE HCL 12.5 MG
25 TABLET ORAL ONCE
Qty: 0 | Refills: 0 | Status: COMPLETED | OUTPATIENT
Start: 2017-07-08 | End: 2017-07-08

## 2017-07-08 RX ORDER — SODIUM CHLORIDE 9 MG/ML
1000 INJECTION INTRAMUSCULAR; INTRAVENOUS; SUBCUTANEOUS ONCE
Qty: 0 | Refills: 0 | Status: COMPLETED | OUTPATIENT
Start: 2017-07-08 | End: 2017-07-08

## 2017-07-08 RX ORDER — FAMOTIDINE 10 MG/ML
1 INJECTION INTRAVENOUS
Qty: 30 | Refills: 0 | OUTPATIENT
Start: 2017-07-08 | End: 2017-08-07

## 2017-07-08 RX ORDER — ONDANSETRON 8 MG/1
1 TABLET, FILM COATED ORAL
Qty: 30 | Refills: 0 | OUTPATIENT
Start: 2017-07-08 | End: 2017-07-18

## 2017-07-08 RX ORDER — KETOROLAC TROMETHAMINE 30 MG/ML
15 SYRINGE (ML) INJECTION ONCE
Qty: 0 | Refills: 0 | Status: DISCONTINUED | OUTPATIENT
Start: 2017-07-08 | End: 2017-07-08

## 2017-07-08 RX ADMIN — Medication 15 MILLIGRAM(S): at 12:20

## 2017-07-08 RX ADMIN — Medication 15 MILLIGRAM(S): at 12:49

## 2017-07-08 RX ADMIN — Medication 125 MILLIGRAM(S): at 10:51

## 2017-07-08 RX ADMIN — Medication 10 MILLIGRAM(S): at 14:06

## 2017-07-08 RX ADMIN — Medication 400 MILLIGRAM(S): at 12:20

## 2017-07-08 RX ADMIN — Medication 10 MILLIGRAM(S): at 10:00

## 2017-07-08 RX ADMIN — SODIUM CHLORIDE 1000 MILLILITER(S): 9 INJECTION INTRAMUSCULAR; INTRAVENOUS; SUBCUTANEOUS at 10:00

## 2017-07-08 RX ADMIN — FAMOTIDINE 20 MILLIGRAM(S): 10 INJECTION INTRAVENOUS at 14:06

## 2017-07-08 RX ADMIN — Medication 1000 MILLIGRAM(S): at 12:49

## 2017-07-08 RX ADMIN — Medication 25 MILLIGRAM(S): at 14:06

## 2017-07-08 NOTE — ED PROVIDER NOTE - ATTENDING CONTRIBUTION TO CARE
71f, pmhx hypoparathyroidsim, breast ca. stared developing h/a 1 year ago. has had extensive w/u, including MRIs, CTs. has a stable c2 fx. was admitted recently for hypercalcemia thought to be due to parathyroid replacement hormone. normally takes NSAIDs with relief. This am, awoke with similar h/a but more severe, a/w vomiting and dizziness. has never had an ED visit for these h/a. has not tried any meds today due to the vomiting. otherwise h/a is same as previous headaches. exam, vs wnl, nad. hs and lungs normal, no focal weakness or numbness, cn II-XII normal. CT head neg, labs normal including calcium. Will treat symptomatically. if improves, can d/c. if not, can admit for pain control and further w/u. Her son is an MD who agrees with the plan.

## 2017-07-08 NOTE — ED PROVIDER NOTE - MEDICAL DECISION MAKING DETAILS
71F with ongoing outpt heaache workup p/w HA, nausea, lightheadedness - symptoms with gradual onset, no focal deficits appreciated and no recent trauma/falls - symptoms c/w previous HAs during time of this outpt work-up and no sudden onset, will treat symptomatically for HA relief, check ionized calcium in addition to basic labs given recent admission for hypercalcemia presumed 2/2 forteo use at the time, CT head, reassess; son at bedside agreeable with plan ok with dc if symptoms improved

## 2017-07-08 NOTE — ED PROVIDER NOTE - OBJECTIVE STATEMENT
71F h/o C2 fracture, hyperparathyroid, hypothyroidism, breast ca, chronic headaches currently being w/u p/w headache and nausea. Symptoms began last night and gradually worsened through to this morning. She states headache in usual distribution, starting "on top of head" and moving down to frontal aspect of head and behind eyes. +photophobia, nausea, and lightheadedness; no vomiting. Pt had called neighbors for assistance after she sat herself down at bottom of stairs and could not get up as felt weak 2/2 pain. Denies recent falls/trauma. Pain 8/10, slightly worse than typical episodes. Outpt w/u included CT and MRI in the past with no significant findings to date. Known C2 fracture from previous fall without new focal deficits.

## 2017-07-08 NOTE — PROVIDER CONTACT NOTE (OTHER) - BACKGROUND
pt lives alone .patient and son at bedside stated except for headache she is independent  ADLS and able to ambulate with out assistance

## 2017-07-08 NOTE — ED PROVIDER NOTE - PMH
Breast Cancer    Chronic midline low back pain with right-sided sciatica    Hyperparathyroidism    Hypothyroidism, unspecified type    Nodular Goiter    Odontoid fracture, sequela

## 2017-07-08 NOTE — PROVIDER CONTACT NOTE (OTHER) - ASSESSMENT
son is interested in hiring in information regarding aide services in the home to assist pt   Based on pt having no skilled  RN needs for home care services through her insurance , list of private hire aide agency provided to pt and family son is interested in information regarding aide services in the home to assist pt   Based on pt having no skilled  RN needs for home care services through her insurance , list of private hire aide agency provided to pt and family

## 2017-07-08 NOTE — PROVIDER CONTACT NOTE (OTHER) - SITUATION
Met and spoke with pt today at bedside regarding d/c plan.  pt with headache and dizziness being evaluated in ED

## 2017-07-08 NOTE — ED PROVIDER NOTE - PROGRESS NOTE DETAILS
Pt feeling better after second dose of reglan and meclizine. Ambulatory. Son, neurosurgeon, planning to stay home with patient and amenable with plan for dc.

## 2018-11-02 ENCOUNTER — EMERGENCY (EMERGENCY)
Facility: HOSPITAL | Age: 73
LOS: 1 days | Discharge: HOME | End: 2018-11-02
Attending: EMERGENCY MEDICINE | Admitting: EMERGENCY MEDICINE
Payer: MEDICARE

## 2018-11-02 VITALS
OXYGEN SATURATION: 99 % | HEART RATE: 93 BPM | SYSTOLIC BLOOD PRESSURE: 166 MMHG | RESPIRATION RATE: 18 BRPM | TEMPERATURE: 98 F | DIASTOLIC BLOOD PRESSURE: 76 MMHG

## 2018-11-02 VITALS
HEART RATE: 78 BPM | TEMPERATURE: 98 F | DIASTOLIC BLOOD PRESSURE: 86 MMHG | SYSTOLIC BLOOD PRESSURE: 136 MMHG | RESPIRATION RATE: 18 BRPM | OXYGEN SATURATION: 100 %

## 2018-11-02 PROBLEM — M54.41 LUMBAGO WITH SCIATICA, RIGHT SIDE: Chronic | Status: ACTIVE | Noted: 2017-06-15

## 2018-11-02 PROBLEM — S12.100S: Chronic | Status: ACTIVE | Noted: 2017-06-15

## 2018-11-02 PROBLEM — E03.9 HYPOTHYROIDISM, UNSPECIFIED: Chronic | Status: ACTIVE | Noted: 2017-06-15

## 2018-11-02 LAB
ALBUMIN SERPL ELPH-MCNC: 4 G/DL — SIGNIFICANT CHANGE UP (ref 3.3–5)
ALP SERPL-CCNC: 68 U/L — SIGNIFICANT CHANGE UP (ref 40–120)
ALT FLD-CCNC: 22 U/L — SIGNIFICANT CHANGE UP (ref 4–33)
AST SERPL-CCNC: 47 U/L — HIGH (ref 4–32)
BASE EXCESS BLDV CALC-SCNC: 4.9 MMOL/L — SIGNIFICANT CHANGE UP
BASOPHILS # BLD AUTO: 0.05 K/UL — SIGNIFICANT CHANGE UP (ref 0–0.2)
BASOPHILS NFR BLD AUTO: 0.7 % — SIGNIFICANT CHANGE UP (ref 0–2)
BILIRUB SERPL-MCNC: 0.5 MG/DL — SIGNIFICANT CHANGE UP (ref 0.2–1.2)
BLOOD GAS VENOUS - CREATININE: SIGNIFICANT CHANGE UP MG/DL (ref 0.5–1.3)
BUN SERPL-MCNC: 12 MG/DL — SIGNIFICANT CHANGE UP (ref 7–23)
CALCIUM SERPL-MCNC: 9 MG/DL — SIGNIFICANT CHANGE UP (ref 8.4–10.5)
CHLORIDE BLDV-SCNC: 90 MMOL/L — LOW (ref 96–108)
CHLORIDE SERPL-SCNC: 85 MMOL/L — LOW (ref 98–107)
CO2 SERPL-SCNC: 24 MMOL/L — SIGNIFICANT CHANGE UP (ref 22–31)
CREAT SERPL-MCNC: 0.62 MG/DL — SIGNIFICANT CHANGE UP (ref 0.5–1.3)
EOSINOPHIL # BLD AUTO: 0.1 K/UL — SIGNIFICANT CHANGE UP (ref 0–0.5)
EOSINOPHIL NFR BLD AUTO: 1.5 % — SIGNIFICANT CHANGE UP (ref 0–6)
GAS PNL BLDV: 122 MMOL/L — LOW (ref 136–146)
GLUCOSE BLDV-MCNC: 146 — HIGH (ref 70–99)
GLUCOSE SERPL-MCNC: 136 MG/DL — HIGH (ref 70–99)
HCO3 BLDV-SCNC: 29 MMOL/L — HIGH (ref 20–27)
HCT VFR BLD CALC: 34.8 % — SIGNIFICANT CHANGE UP (ref 34.5–45)
HCT VFR BLDV CALC: 42 % — SIGNIFICANT CHANGE UP (ref 34.5–45)
HGB BLD-MCNC: 12 G/DL — SIGNIFICANT CHANGE UP (ref 11.5–15.5)
HGB BLDV-MCNC: 13.7 G/DL — SIGNIFICANT CHANGE UP (ref 11.5–15.5)
IMM GRANULOCYTES # BLD AUTO: 0.01 # — SIGNIFICANT CHANGE UP
IMM GRANULOCYTES NFR BLD AUTO: 0.1 % — SIGNIFICANT CHANGE UP (ref 0–1.5)
LACTATE BLDV-MCNC: 2.5 MMOL/L — HIGH (ref 0.5–2)
LYMPHOCYTES # BLD AUTO: 2.15 K/UL — SIGNIFICANT CHANGE UP (ref 1–3.3)
LYMPHOCYTES # BLD AUTO: 31.3 % — SIGNIFICANT CHANGE UP (ref 13–44)
MCHC RBC-ENTMCNC: 27.7 PG — SIGNIFICANT CHANGE UP (ref 27–34)
MCHC RBC-ENTMCNC: 34.5 % — SIGNIFICANT CHANGE UP (ref 32–36)
MCV RBC AUTO: 80.4 FL — SIGNIFICANT CHANGE UP (ref 80–100)
MONOCYTES # BLD AUTO: 0.56 K/UL — SIGNIFICANT CHANGE UP (ref 0–0.9)
MONOCYTES NFR BLD AUTO: 8.2 % — SIGNIFICANT CHANGE UP (ref 2–14)
NEUTROPHILS # BLD AUTO: 3.99 K/UL — SIGNIFICANT CHANGE UP (ref 1.8–7.4)
NEUTROPHILS NFR BLD AUTO: 58.2 % — SIGNIFICANT CHANGE UP (ref 43–77)
NRBC # FLD: 0 — SIGNIFICANT CHANGE UP
PCO2 BLDV: 30 MMHG — LOW (ref 41–51)
PH BLDV: 7.57 PH — HIGH (ref 7.32–7.43)
PLATELET # BLD AUTO: 172 K/UL — SIGNIFICANT CHANGE UP (ref 150–400)
PMV BLD: 11.6 FL — SIGNIFICANT CHANGE UP (ref 7–13)
PO2 BLDV: 36 MMHG — SIGNIFICANT CHANGE UP (ref 35–40)
POTASSIUM BLDV-SCNC: 3.4 MMOL/L — SIGNIFICANT CHANGE UP (ref 3.4–4.5)
POTASSIUM SERPL-MCNC: 4 MMOL/L — SIGNIFICANT CHANGE UP (ref 3.5–5.3)
POTASSIUM SERPL-SCNC: 4 MMOL/L — SIGNIFICANT CHANGE UP (ref 3.5–5.3)
PROT SERPL-MCNC: 6.8 G/DL — SIGNIFICANT CHANGE UP (ref 6–8.3)
RBC # BLD: 4.33 M/UL — SIGNIFICANT CHANGE UP (ref 3.8–5.2)
RBC # FLD: 13.8 % — SIGNIFICANT CHANGE UP (ref 10.3–14.5)
SAO2 % BLDV: 74 % — SIGNIFICANT CHANGE UP (ref 60–85)
SODIUM SERPL-SCNC: 126 MMOL/L — LOW (ref 135–145)
WBC # BLD: 6.86 K/UL — SIGNIFICANT CHANGE UP (ref 3.8–10.5)
WBC # FLD AUTO: 6.86 K/UL — SIGNIFICANT CHANGE UP (ref 3.8–10.5)

## 2018-11-02 PROCEDURE — 72125 CT NECK SPINE W/O DYE: CPT | Mod: 26

## 2018-11-02 PROCEDURE — 99284 EMERGENCY DEPT VISIT MOD MDM: CPT | Mod: GC,25

## 2018-11-02 PROCEDURE — 70450 CT HEAD/BRAIN W/O DYE: CPT | Mod: 26

## 2018-11-02 RX ORDER — FAMOTIDINE 10 MG/ML
20 INJECTION INTRAVENOUS ONCE
Qty: 0 | Refills: 0 | Status: COMPLETED | OUTPATIENT
Start: 2018-11-02 | End: 2018-11-02

## 2018-11-02 RX ORDER — KETOROLAC TROMETHAMINE 30 MG/ML
15 SYRINGE (ML) INJECTION ONCE
Qty: 0 | Refills: 0 | Status: DISCONTINUED | OUTPATIENT
Start: 2018-11-02 | End: 2018-11-02

## 2018-11-02 RX ORDER — METOCLOPRAMIDE HCL 10 MG
10 TABLET ORAL ONCE
Qty: 0 | Refills: 0 | Status: COMPLETED | OUTPATIENT
Start: 2018-11-02 | End: 2018-11-02

## 2018-11-02 RX ORDER — SODIUM CHLORIDE 9 MG/ML
1000 INJECTION INTRAMUSCULAR; INTRAVENOUS; SUBCUTANEOUS ONCE
Qty: 0 | Refills: 0 | Status: COMPLETED | OUTPATIENT
Start: 2018-11-02 | End: 2018-11-02

## 2018-11-02 RX ORDER — ACETAMINOPHEN 500 MG
975 TABLET ORAL ONCE
Qty: 0 | Refills: 0 | Status: COMPLETED | OUTPATIENT
Start: 2018-11-02 | End: 2018-11-02

## 2018-11-02 RX ADMIN — Medication 975 MILLIGRAM(S): at 03:30

## 2018-11-02 RX ADMIN — SODIUM CHLORIDE 500 MILLILITER(S): 9 INJECTION INTRAMUSCULAR; INTRAVENOUS; SUBCUTANEOUS at 05:36

## 2018-11-02 RX ADMIN — Medication 15 MILLIGRAM(S): at 05:42

## 2018-11-02 RX ADMIN — FAMOTIDINE 20 MILLIGRAM(S): 10 INJECTION INTRAVENOUS at 02:24

## 2018-11-02 RX ADMIN — Medication 10 MILLIGRAM(S): at 02:24

## 2018-11-02 RX ADMIN — Medication 975 MILLIGRAM(S): at 02:24

## 2018-11-02 RX ADMIN — Medication 15 MILLIGRAM(S): at 06:00

## 2018-11-02 RX ADMIN — SODIUM CHLORIDE 1000 MILLILITER(S): 9 INJECTION INTRAMUSCULAR; INTRAVENOUS; SUBCUTANEOUS at 05:00

## 2018-11-02 RX ADMIN — SODIUM CHLORIDE 1000 MILLILITER(S): 9 INJECTION INTRAMUSCULAR; INTRAVENOUS; SUBCUTANEOUS at 02:24

## 2018-11-02 NOTE — ED ADULT NURSE NOTE - OBJECTIVE STATEMENT
pt sitting in stretcher with family at bedside reports gradual dizziness and headache since this afternoon with a feeling of acid reflux.  pt reports she took advil earlier today with no relief.  denies vision changes, all extremities equal in strength, speech clear and coherent.  follows all commands and answers questions appropriately.

## 2018-11-02 NOTE — ED ADULT NURSE REASSESSMENT NOTE - NS ED NURSE REASSESS COMMENT FT1
Pt reports decrease in dizziness. Sleeping, arousable by voice. Reports continual headache. Pending CT. Appears comfortable

## 2018-11-02 NOTE — ED PROVIDER NOTE - NEUROLOGICAL, MLM
Alert and oriented, no focal deficits, no motor or sensory deficits. cn 2-12 intact, finger to nose wnl. no nystagmus

## 2018-11-02 NOTE — ED PROVIDER NOTE - ATTENDING CONTRIBUTION TO CARE
PHILLIP CARTER MD: 74 yo F presents with HA a/w room-spinning sensation and nausea and vomiting (NB/NB) since 5pm. Patient states she had some chest pain and throat discomfort after vomiting. HA gradual onset. Patient states she's had similar headaches before. Patient states she follows with her son who is a neurosurgeon. A different son at bedside  D/W son via phone who states she mr neck that was normal about 1week ago. No recent imaging of the brain. Son via phone requested CT head, symptomatic treatment and if normal CTH the patient be discharged.    Sons phone: [phone] (Neurosurgeon)    PHYSICAL EXAM:    GENERAL: Patient is awake and alert and in no acute distress.  Non-toxic appearing.  A+Ox4  HEAD:  Airway patent.  No oropharyngeal edema.  No stridor.  Auricles are normal.    EYES: EOM grossly intact, conjunctiva non-injected and sclera clear  NECK: Supple, No vertebral point tenderness to palpation.  CHEST/LUNG: Lungs clear to auscultation bilaterally; no wheeze, no rhonchi,  no rales.    HEART: Regular rate and rhythm;   ABDOMEN: Soft, non-tender to palpation.  No rebound/no guarding.  Bowel sounds present x 4.   MSK/EXTREMITIES: No clubbing or cyanosis. Back is nontender, with no vertebral point tenderness to palpation, no CVAT.  Moving all 4 extremities.     NEURO: Neurologically grossly intact.   No obvious deficits.   PSYCH: Psychiatrically normal mood and affect.  No apparent risk to self or others.       DR. FU, ATTENDING MD:    I performed a face to face bedside interview with patient regarding history of present illness, review of symptoms and past medical history. I completed an independent physical exam.  I have discussed patient's plan of care with the team of health care providers.   I agree with note as stated above, having amended the EMR as needed to reflect my findings. I have discussed the assessment and plan of care.  This includes during the time I functioned as the attending physician for this patient.

## 2018-11-02 NOTE — ED ADULT TRIAGE NOTE - CHIEF COMPLAINT QUOTE
Pt arrives from home via EMS. Pt states 3 hours ago she began experiencing nausea, vomiting, dizziness, chest pain, shortness of breath and headache.

## 2018-11-02 NOTE — ED ADULT NURSE NOTE - NSIMPLEMENTINTERV_GEN_ALL_ED
Implemented All Universal Safety Interventions:  [place] to call system. Call bell, personal items and telephone within reach. Instruct patient to call for assistance. Room bathroom lighting operational. Non-slip footwear when patient is off stretcher. Physically safe environment: no spills, clutter or unnecessary equipment. Stretcher in lowest position, wheels locked, appropriate side rails in place.

## 2018-11-02 NOTE — ED PROVIDER NOTE - PROGRESS NOTE DETAILS
Pt's son requests that pt receive ct head despite prior imaging demonstrating no mass and story consistent with chronic headache that pt has rather than a bleed. Pt still with pain, requesting more medication. will give toradol Pts pain now fully resolved. Pt requesting discharged. Calling son to pick her up.

## 2018-11-02 NOTE — ED PROVIDER NOTE - OBJECTIVE STATEMENT
73yF p/w sudden onset ha, room-spinning sensation, vomiting since 5-6pm. pt endorses pain in chest that began after vomiting. HA gradual onset. pt has had similar headaches in past. Pt follows with her son who is a neurosurgeon. Had mr neck that was wnl 1wk ago. Has had head imaging w/in past 5yrs.     Sons phone: [phone] (Neurosurgeon)

## 2018-11-02 NOTE — ED PROVIDER NOTE - CARE PLAN
Principal Discharge DX:	Headache  Assessment and plan of treatment:	The patient was given verbal and written discharge instructions. Specifically, instructions when to return to the ED and when to seek follow-up from their pcp was discussed. Any specialty follow-up was discussed, including how to make an appointment.  Instructions were discussed in simple, plain language and was understood by the patient. The patient understands that should their symptoms worsen or any new symptoms arise, they should return to the ED immediately for further evaluation.

## 2019-09-19 ENCOUNTER — APPOINTMENT (OUTPATIENT)
Dept: OTOLARYNGOLOGY | Facility: CLINIC | Age: 74
End: 2019-09-19

## 2019-11-26 NOTE — ED PROVIDER NOTE - CROS ED NEURO ALL NEG
HPI:   Khanh Curry is a 48year old female who presents for a complete physical exam. Symptoms: denies discharge, itching, burning or dysuria, is menopausal. Patient complains of nothing new.      Depression: better with bupropion and cymbalta, would magi mouth daily.         Past Medical History:   Diagnosis Date   • Antiphospholipid antibody syndrome (HCC)    • Anxiety    • Clotting disorder (HCC)    • Depression    • Esophageal reflux    • SLE (systemic lupus erythematosus) (HCC)    • Unspecified essentia HEENT: atraumatic, normocephalic,ears and throat are clear  EYES:PERRLA, EOMI, conjunctiva are clear  NECK: supple,no adenopathy,   CHEST: no chest tenderness  BREAST: no dominant or suspicious mass  LUNGS: clear to auscultation  CARDIO: RRR without murm - - -

## 2020-08-13 NOTE — CHART NOTE - NSCHARTNOTEFT_GEN_A_CORE
Pt. C/O of difficulty in speech. Neuro consulted, recommend for CT angiogram. Consent received, called radiology for expediting, radiology said that it will be done within 1-2 hours today.    ADS
CTA of head and neck displayed no significant flow, limiting stenosis.  Discussed results with neurology resident on call, ok for patient to go home.  Will need to follow up with neurologist on Monday.  S/W son, is aware of the results.  Attending is aware
Patient brought in for hypercalcemia and speech difficulty. Speech difficulty was resolved last night but returned today. Discussed with son about imaging and son wanted MRI. Consulted neurology, recommend CT head first. Patient is in agreement.     ADS
Patient requested Tylenol. As per records she is allergic to percocet. Patient reports no allergy to tylenol. She takes this at home PRN.
Low Suspicion of COVID-19

## 2021-07-16 PROBLEM — Z00.00 ENCOUNTER FOR PREVENTIVE HEALTH EXAMINATION: Noted: 2021-07-16

## 2021-07-26 ENCOUNTER — APPOINTMENT (OUTPATIENT)
Dept: SPINE | Facility: CLINIC | Age: 76
End: 2021-07-26
Payer: MEDICARE

## 2021-07-26 VITALS
SYSTOLIC BLOOD PRESSURE: 127 MMHG | DIASTOLIC BLOOD PRESSURE: 75 MMHG | BODY MASS INDEX: 26.61 KG/M2 | WEIGHT: 132 LBS | HEART RATE: 76 BPM | HEIGHT: 59 IN | OXYGEN SATURATION: 95 %

## 2021-07-26 PROCEDURE — 99203 OFFICE O/P NEW LOW 30 MIN: CPT

## 2021-07-26 RX ORDER — ZOLPIDEM TARTRATE 10 MG/1
10 TABLET ORAL
Refills: 0 | Status: ACTIVE | COMMUNITY

## 2021-07-26 RX ORDER — ALPRAZOLAM 2 MG/1
TABLET ORAL
Refills: 0 | Status: ACTIVE | COMMUNITY

## 2021-07-26 RX ORDER — LINACLOTIDE 72 UG/1
72 CAPSULE, GELATIN COATED ORAL
Refills: 0 | Status: ACTIVE | COMMUNITY

## 2021-07-26 RX ORDER — CALCITRIOL 0.5 UG/1
CAPSULE, LIQUID FILLED ORAL
Refills: 0 | Status: ACTIVE | COMMUNITY

## 2021-07-26 RX ORDER — OMEPRAZOLE 20 MG/1
20 CAPSULE, DELAYED RELEASE ORAL
Refills: 0 | Status: ACTIVE | COMMUNITY

## 2021-07-26 RX ORDER — LEVOTHYROXINE SODIUM 137 UG/1
TABLET ORAL
Refills: 0 | Status: ACTIVE | COMMUNITY

## 2021-07-26 RX ORDER — ROSUVASTATIN CALCIUM 10 MG/1
10 TABLET, FILM COATED ORAL
Refills: 0 | Status: ACTIVE | COMMUNITY

## 2021-07-26 RX ORDER — VORTIOXETINE 10 MG/1
10 TABLET, FILM COATED ORAL
Refills: 0 | Status: ACTIVE | COMMUNITY

## 2021-07-26 NOTE — REVIEW OF SYSTEMS
[Numbness] : numbness [Tingling] : tingling [Difficulty Walking] : difficulty walking [Frequent Falls] : frequent falls [Negative] : Heme/Lymph

## 2021-07-26 NOTE — REASON FOR VISIT
[New Patient Visit] : a new patient visit [Referred By: _________] : Patient was referred by HAMILTON [Other: _____] : [unfilled] [FreeTextEntry1] : Chronic lumbar radiculopathy

## 2021-07-26 NOTE — HISTORY OF PRESENT ILLNESS
[de-identified] : \par Ms Castillo is a 75-year-old woman here for a long standing history of lumbar radiculopathy  and s/p three lumbar surgeries including lumbar laminectomy , L 3 to L 5 and again in November 2020 she underwent a  T 12 to  lumbar laminectomy.  The original back and leg pain are resolved.  She suffers from pain from the right knee into the foot with numbness.   She  requires advil to increase the sensation of her foot and be able to drive.  She describes a pressure of her foot as well.  She falls on occasion.  No foot weakness is noted.  She has pain and numb of her sole of her foot.    She had four months of PT which was note effective.   She is on Gabapentin which is not beneficial.   The pain is reported at a 9/10.  The MRI  shows a lumbar seroma and  prior surgery long with bilateral L4-5 foraminal stenosis. There is no significant central stenosis.

## 2021-07-26 NOTE — SURGICAL HISTORY
[de-identified] : OSH:   2/3/2015 L 4 L 5 laminectomy,   11/16/2020 L 3 to L 5 laminectomy redo  and 5/26/2016 T 12 to L 1 lami

## 2021-07-26 NOTE — PHYSICAL EXAM
[Person] : oriented to person [Place] : oriented to place [Time] : oriented to time [Short Term Intact] : short term memory intact [Remote Intact] : remote memory intact [Span Intact] : the attention span was normal [Concentration Intact] : normal concentrating ability [Fluency] : fluency intact [Comprehension] : comprehension intact [Current Events] : adequate knowledge of current events [Past History] : adequate knowledge of personal past history [Vocabulary] : adequate range of vocabulary [Cranial Nerves Optic (II)] : visual acuity intact bilaterally,  pupils equal round and reactive to light [Cranial Nerves Oculomotor (III)] : extraocular motion intact [Cranial Nerves Trigeminal (V)] : facial sensation intact symmetrically [Cranial Nerves Facial (VII)] : face symmetrical [Cranial Nerves Vestibulocochlear (VIII)] : hearing was intact bilaterally [Cranial Nerves Glossopharyngeal (IX)] : tongue and palate midline [Cranial Nerves Accessory (XI - Cranial And Spinal)] : head turning and shoulder shrug symmetric [Cranial Nerves Hypoglossal (XII)] : there was no tongue deviation with protrusion [Motor Tone] : muscle tone was normal in all four extremities [Motor Strength] : muscle strength was normal in all four extremities [No Muscle Atrophy] : normal bulk in all four extremities [Sensation Tactile Decrease] : light touch was intact [Abnormal Walk] : normal gait [Balance] : balance was intact [2+] : Ankle jerk left 2+ [No Tenderness to Palpation] : no spine tenderness on palpation [Past-pointing] : there was no past-pointing [Tremor] : no tremor present [Plantar Reflex Right Only] : normal on the right [Plantar Reflex Left Only] : normal on the left [Gonzalez] : Gonzalez's sign was not demonstrated [Straight-Leg Raise Test - Left] : straight leg raise of the left leg was negative [Straight-Leg Raise Test - Right] : straight leg raise  of the right leg was negative

## 2021-07-26 NOTE — CONSULT LETTER
[Dear  ___] : Dear  [unfilled], [Consult Letter:] : I had the pleasure of evaluating your patient, [unfilled]. [Please see my note below.] : Please see my note below. [Consult Closing:] : Thank you very much for allowing me to participate in the care of this patient.  If you have any questions, please do not hesitate to contact me. [Sincerely,] : Sincerely, [FreeTextEntry3] : Rony Pollard MD\par Chief of Neurosurgery [facility]\par [facility]\par

## 2021-07-26 NOTE — ASSESSMENT
[FreeTextEntry1] : Long standing lumbar radiculopathy of right leg.     S/P three lumbar laminectomy.  She will have a repeat MRI of the lumbar spine and a CT scan of the lumbar spine.  The seroma noted postop on the MRI will be compared to the  prior image for any changes in morphology.  She will undergo a flexion/extension xray and a scoliosis xray to evaluated for spondylolisthesis.  She will return once the images are complete.    An EMG was ordered as well.  I examined and evaluated this patient with the nurse practitioner and we agreed on a plan of care.\par \par \par \par \par CC:\par Diego Gomes MD\par [address]\[place], [place]

## 2021-08-09 ENCOUNTER — OUTPATIENT (OUTPATIENT)
Dept: OUTPATIENT SERVICES | Facility: HOSPITAL | Age: 76
LOS: 1 days | End: 2021-08-09
Payer: MEDICARE

## 2021-08-09 ENCOUNTER — RESULT REVIEW (OUTPATIENT)
Age: 76
End: 2021-08-09

## 2021-08-09 ENCOUNTER — APPOINTMENT (OUTPATIENT)
Dept: RADIOLOGY | Facility: CLINIC | Age: 76
End: 2021-08-09
Payer: MEDICARE

## 2021-08-09 ENCOUNTER — APPOINTMENT (OUTPATIENT)
Dept: MRI IMAGING | Facility: CLINIC | Age: 76
End: 2021-08-09
Payer: MEDICARE

## 2021-08-09 ENCOUNTER — APPOINTMENT (OUTPATIENT)
Dept: CT IMAGING | Facility: CLINIC | Age: 76
End: 2021-08-09
Payer: MEDICARE

## 2021-08-09 DIAGNOSIS — Z00.00 ENCOUNTER FOR GENERAL ADULT MEDICAL EXAMINATION WITHOUT ABNORMAL FINDINGS: ICD-10-CM

## 2021-08-09 PROCEDURE — 72148 MRI LUMBAR SPINE W/O DYE: CPT | Mod: MH

## 2021-08-09 PROCEDURE — 72084 X-RAY EXAM ENTIRE SPI 6/> VW: CPT | Mod: 26

## 2021-08-09 PROCEDURE — 72148 MRI LUMBAR SPINE W/O DYE: CPT | Mod: 26,MH

## 2021-08-09 PROCEDURE — 72131 CT LUMBAR SPINE W/O DYE: CPT | Mod: 26,MH

## 2021-08-09 PROCEDURE — 72131 CT LUMBAR SPINE W/O DYE: CPT | Mod: MH

## 2021-08-09 PROCEDURE — 72084 X-RAY EXAM ENTIRE SPI 6/> VW: CPT

## 2021-08-09 PROCEDURE — 72082 X-RAY EXAM ENTIRE SPI 2/3 VW: CPT

## 2021-08-09 PROCEDURE — 72110 X-RAY EXAM L-2 SPINE 4/>VWS: CPT

## 2021-08-23 ENCOUNTER — APPOINTMENT (OUTPATIENT)
Dept: SPINE | Facility: CLINIC | Age: 76
End: 2021-08-23
Payer: MEDICARE

## 2021-08-23 VITALS
HEIGHT: 59 IN | HEART RATE: 62 BPM | WEIGHT: 132 LBS | DIASTOLIC BLOOD PRESSURE: 83 MMHG | BODY MASS INDEX: 26.61 KG/M2 | OXYGEN SATURATION: 97 % | SYSTOLIC BLOOD PRESSURE: 151 MMHG

## 2021-08-23 DIAGNOSIS — Z86.59 PERSONAL HISTORY OF OTHER MENTAL AND BEHAVIORAL DISORDERS: ICD-10-CM

## 2021-08-23 DIAGNOSIS — Z86.39 PERSONAL HISTORY OF OTHER ENDOCRINE, NUTRITIONAL AND METABOLIC DISEASE: ICD-10-CM

## 2021-08-23 DIAGNOSIS — M54.16 RADICULOPATHY, LUMBAR REGION: ICD-10-CM

## 2021-08-23 PROCEDURE — 99213 OFFICE O/P EST LOW 20 MIN: CPT

## 2021-08-23 NOTE — DATA REVIEWED
[de-identified] : MRI  of lumbar spine from 8/9/2021 at [facility]  [de-identified] : Lumbar scoliosis series and flexion/extension xrays from [facility] 8/9/2021

## 2021-08-23 NOTE — REASON FOR VISIT
[Follow-Up: _____] : a [unfilled] follow-up visit [Other: _____] : [unfilled] [FreeTextEntry1] : \par 75 year old female with chronic lumbar radiculopathy and s/p laminectomy L 3 to L 5 in November 2020.  She has progressive pain and no reaction to non surgical measures.  Walking distances is not difficult.  Additional stenosis is noted on MRI and she is here for radiographic image review and a discussion about  surgical intervention.  She does report a cervical fracture form four years ago, no surgery was performed. 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 There is mild central stenosis at multiple subaxial levels. Flexion extension lumbar x-rays suggest dynamic instability at the L4-5 level where there is a grade 2 spondylolisthesis.  MRI scanning shows significant foraminal stenosis at this level.

## 2021-08-23 NOTE — ASSESSMENT
[FreeTextEntry1] : \par \par 75 year old female with chronic lumbar radiculopathy and post  laminectomy of L 3 to L 5 .  On updated MRi there is stenosis and spondylolisthesis.  Surgery was recommended and Dr Pollard will speak to her son to discuss the findings and recommended surgery.   A lumbar fusion L3-L5 was discussed.  Recovery and the procedure was explained.   Rehab may be necessary and this will be decided at discharge.    Her cervical fracture is stable and no hyperflexion of the neck in the OR.  After Dr Pollard discusses the procedure with her son, surgery will be scheduled.

## 2021-08-23 NOTE — REVIEW OF SYSTEMS
[Leg Weakness] : leg weakness [Poor Coordination] : poor coordination [Numbness] : numbness [Tingling] : tingling [Negative] : Heme/Lymph [de-identified] : back and leg pain

## 2021-10-12 ENCOUNTER — APPOINTMENT (OUTPATIENT)
Dept: NEUROLOGY | Facility: CLINIC | Age: 76
End: 2021-10-12

## 2022-01-04 NOTE — DISCHARGE NOTE ADULT - NS MD DC FALL RISK RISK
For information on Fall & Injury Prevention, visit [email or web address]

## 2022-02-15 NOTE — ED ADULT NURSE NOTE - FALL HARM RISK
bones(Osteoporosis,prev fx,steroid use,metastatic bone ca Product 21 Application Directions: Apply to face twice daily \\nLot# [id number] Product 21 Application Directions: Apply to face twice daily \\nLot# [id number]

## 2022-09-02 NOTE — ED PROVIDER NOTE - CARDIAC, MLM
Normal rate, regular rhythm.  Heart sounds S1, S2.  No murmurs, rubs or gallops. normal/regular rate and rhythm/S1 S2 present/no gallops/no rub/no murmur

## 2022-10-07 ENCOUNTER — APPOINTMENT (OUTPATIENT)
Dept: PHYSICAL MEDICINE AND REHAB | Facility: CLINIC | Age: 77
End: 2022-10-07

## 2022-10-07 VITALS
SYSTOLIC BLOOD PRESSURE: 153 MMHG | OXYGEN SATURATION: 95 % | TEMPERATURE: 97.34 F | DIASTOLIC BLOOD PRESSURE: 72 MMHG | HEART RATE: 76 BPM

## 2022-10-07 DIAGNOSIS — M54.16 RADICULOPATHY, LUMBAR REGION: ICD-10-CM

## 2022-10-07 DIAGNOSIS — M48.061 SPINAL STENOSIS, LUMBAR REGION WITHOUT NEUROGENIC CLAUDICATION: ICD-10-CM

## 2022-10-07 DIAGNOSIS — M47.816 SPONDYLOSIS W/OUT MYELOPATHY OR RADICULOPATHY, LUMBAR REGION: ICD-10-CM

## 2022-10-07 DIAGNOSIS — M51.26 OTHER INTERVERTEBRAL DISC DISPLACEMENT, LUMBAR REGION: ICD-10-CM

## 2022-10-07 PROCEDURE — 99204 OFFICE O/P NEW MOD 45 MIN: CPT

## 2022-10-07 NOTE — CONSULT LETTER
[Dear  ___] : Dear ~YENIFER, [Consult Letter:] : I had the pleasure of evaluating your patient, [unfilled]. [Please see my note below.] : Please see my note below. [Consult Closing:] : Thank you very much for allowing me to participate in the care of this patient.  If you have any questions, please do not hesitate to contact me. [Sincerely,] : Sincerely, [FreeTextEntry2] : Rony Pollard MD\par [address] [facility]\[place] [address] [FreeTextEntry3] : Erika

## 2022-10-07 NOTE — DATA REVIEWED
[MRI] : MRI [FreeTextEntry1] : \par  MR Lumbar Spine No Cont             Final\par \par No Documents Attached\par \par \par \par \par   EXAM:  MR SPINE LUMBAR\par \par \par PROCEDURE DATE:  08/09/2021\par \par \par \par INTERPRETATION:  LUMBOSACRAL SPINE MRI\par \par CLINICAL INFORMATION: Lumbar radiculopathy and back pain. History of lumbar laminectomy.\par TECHNIQUE: Multiplanar, multisequence MR imaging was obtained of the lumbosacral spine.\par COMPARISON CT of the lumbar spine from same day.\par \par FINDINGS:\par \par DISC LEVEL EVALUATION:\par \par T10/T11: Evaluate on sagittal and coronal sequences only. Mild diffuse disc bulge and ligamentum flavum hypertrophy contribute to minimal central spinal canal stenosis. Mild bilateral foraminal narrowing.\par T11/T12: Evaluated on sagittal and coronal sequences only. Mild diffuse disc bulge, ligamentum flavum hypertrophy, and facet right arthrosis. The findings result in mild bilateral neural foraminal narrowing. No central canal narrowing.\par T12/L1: Moderate facet arthrosis. Dysplastic left facet, likely congenital. Mild/moderate diffuse disc osteophyte complex. The findings result in effacement of the ventral thecal sac without central spinal canal stenosis, mild left lateral recess narrowing and moderate left neural foraminal narrowing.\par L1/L2: Mild facet arthrosis and minimal retrolisthesis. Mild diffuse disc bulge with mild superimposed left paracentral protrusion. The findings result in flattening of the ventral thecal sac without central spinal canal stenosis, mild left lateral recess narrowing, and mild bilateral neural foraminal narrowing.\par L2/L3: Moderate right and mild left facet arthrosis. Pronounced ligamentum flavum hypertrophy. Mild diffuse disc bulge. The findings result in mild central spinal canal stenosis and mild bilateral neural foraminal narrowing.\par L3/L4: Laminectomy. Moderate to severe left and moderate right facet arthrosis with trace anterolisthesis eccentric to the right. Mild diffuse disc bulge with mild superimposed right paracentral disc protrusion. The findings result in mild bilateral neural foraminal narrowing and mild right lateral recess narrowing with possible contact of the traversing right L4 nerve root. No central canal narrowing.\par L4/L5: Laminectomy. Severe facet arthrosis and grade 2 anterolisthesis. Unroofing of the disc and minimal posterior disc bulge. The findings result in moderate bilateral lateral recess narrowing and moderate bilateral neural foraminal narrowing. The central spinal canal is decompressed by laminectomy. There is a 1.3 x 0.6 cm fluid filled structure within the laminectomy bed posterior to the central spinal canal, possibly representing a small seroma, without mass effect upon the canal. No central canal narrowing. Moderate to severe bilateral foraminal narrowing is worse on the right.\par L5/S1: Mild posterior disc osteophyte complex. Ligamentum flavum hypertrophy. The findings result in mild to moderate central spinal canal stenosis and moderate bilateral neural foraminal narrowing.\par \par SPINAL ALIGNMENT: Mild levocurvature of the lumbar spine with apex at L2-L3.\par DISTAL CORD AND CONUS: Normal signal. The conus terminates at L2.\par SI JOINTS: Mild to moderate degenerative changes, right worse than left.\par MARROW: Status post L3-L5 laminectomy. Minimal reactive endplate change of the inferior L3 vertebral body. Mild reactive endplate changes, especially the left, on both sides of the L5-S1 joint.\par PARASPINAL MUSCLE AND SOFT TISSUES: Severe fatty replacement of the lower paraspinal musculature. Postsurgical changes.\par INTRAABDOMINAL/INTRAPELVIC SOFT TISSUES: No abnormality.\par \par IMPRESSION:\par Status post L3-L5 laminectomy. Small fluid collection at the L4-5 laminectomy bed likely postsurgical in etiology.\par Moderate multilevel spondylosis as detailed above\par \par --- End of Report ---\par \par \par \par \par \par MERA KRISHANN M.D., RADIOLOGY FELLOW\par This document has been electronically signed.\par ALEXANDRU RAINEY MD; Attending Radiologist\par This document has been electronically signed. Aug 14 2021 11:21AM\par \par  \par \par  Ordered by: SHELLY TRUONG       Collected/Examined: [id number] 03:45PM       \par Verified by: SHELLY TRUONG 28Apr2022 12:28PM       \par  Result Communication: No patient communication needed at this time;\par Stage: Final       \par  Performed at: [facility]       Resulted: [id number] 08:55AM       Last Updated: 28Apr2022 12:28PM       Accession: [id number]

## 2022-10-07 NOTE — ASSESSMENT
[FreeTextEntry1] : 77 year old female with low back and lumbar radicular pain secondary to stenosis.  Given the nature of the patient's complaints and lack of significant improvement following more conservative measures, we did discuss transforaminal epidural steroid injection.  Risks, benefits, and expectations of the procedure were reviewed.  The patient was provided with an educational pamphlet outlining the details of the procedure so that he/she may have the ability to review the information prior to proceeding.  The patient has agreed to proceed and will follow up with me for the procedure.\par

## 2022-10-07 NOTE — HISTORY OF PRESENT ILLNESS
[Back] : back [Other: ___] : [unfilled] [___ mths] : [unfilled] month(s) ago [5] : a current pain level of 5/10 [10] : a maximum pain level of 10/10 [Aching] : aching [Right] : right [Anterior] : anterior aspect of the [Feet] : feet [Numbness] : numbness [Laying] : laying [Sitting] : sitting [Medications] : medications [Insomnia] : insomnia [Gait Dysfunction] : gait dysfunction [Injections] : injections [Surgery] : surgery [de-identified] : "heavy" [FreeTextEntry2] : both feet [FreeTextEntry4] : Advil, Lyrica [FreeTextEntry6] : had 3 lumbar surgeries, had epidural injections in the past, last one was about 5 years ago

## 2022-11-29 ENCOUNTER — APPOINTMENT (OUTPATIENT)
Dept: CARDIOLOGY | Facility: CLINIC | Age: 77
End: 2022-11-29

## 2022-12-08 ENCOUNTER — APPOINTMENT (OUTPATIENT)
Dept: PULMONOLOGY | Facility: CLINIC | Age: 77
End: 2022-12-08

## 2022-12-08 VITALS
DIASTOLIC BLOOD PRESSURE: 86 MMHG | BODY MASS INDEX: 27.21 KG/M2 | TEMPERATURE: 97 F | HEART RATE: 72 BPM | OXYGEN SATURATION: 95 % | WEIGHT: 135 LBS | SYSTOLIC BLOOD PRESSURE: 134 MMHG | RESPIRATION RATE: 15 BRPM | HEIGHT: 59 IN

## 2022-12-08 PROCEDURE — 99203 OFFICE O/P NEW LOW 30 MIN: CPT

## 2022-12-08 NOTE — HISTORY OF PRESENT ILLNESS
[TextBox_4] : 77-year-old female with a nonproductive cough. The patient has been in reasonably good health without any prior history of lung issues. On a trip to [facility] she developed upper airway congestion, left earache and sore throat and sought medical attention. She was given azithromycin without much relief. When she flew back to the [facility], she again sought medical attention. Nasopharyngeal swab for virus as were negative. She was placed on an albuterol inhaler and benzonatate again without relief. The cough has been present for 3-4 weeks. It is nonproductive and may be triggered by talking but not by position nor by eating or drinking or exertion. She denies any dyspnea, chest pain or wheezing.She is a lifelong nonsmoker. She does take omeprazole for reflux symptoms and atorvastatin for hypercholesterolemia. She uses an occasional Ambien for sleep.\par She does have a history of breast cancer for which she underwent mastectomy. She also has a history of lumbar stenosis

## 2022-12-08 NOTE — ASSESSMENT
[FreeTextEntry1] : The patient had a chest radiograph which apparently was normal. She brought with her a disc which contain no images. The character of her cough with a normal chest x-ray sounds very much like it is an upper airway cough syndrome. I prescribed nasal fluticasone to be used 2 sprays each nostril twice a day for the next 4 weeks. She is actually scheduled for cataract surgery in 2 weeks which may have to be postponed if she is continuing to cough.

## 2022-12-08 NOTE — PHYSICAL EXAM
[No Acute Distress] : no acute distress [Normal Oropharynx] : normal oropharynx [Normal Appearance] : normal appearance [Normal Rate/Rhythm] : normal rate/rhythm [Normal S1, S2] : normal s1, s2 [No Resp Distress] : no resp distress [Clear to Auscultation Bilaterally] : clear to auscultation bilaterally [Normal Gait] : normal gait [No Clubbing] : no clubbing [No Edema] : no edema [No Focal Deficits] : no focal deficits [TextBox_11] : Secretions in the posterior pharynx

## 2022-12-19 ENCOUNTER — APPOINTMENT (OUTPATIENT)
Dept: PULMONOLOGY | Facility: CLINIC | Age: 77
End: 2022-12-19

## 2022-12-21 ENCOUNTER — RX RENEWAL (OUTPATIENT)
Age: 77
End: 2022-12-21

## 2022-12-21 RX ORDER — FLUTICASONE PROPIONATE 50 UG/1
50 SPRAY, METERED NASAL TWICE DAILY
Qty: 16 | Refills: 0 | Status: ACTIVE | COMMUNITY
Start: 2022-12-09 | End: 1900-01-01

## 2023-02-07 ENCOUNTER — APPOINTMENT (OUTPATIENT)
Dept: CARDIOLOGY | Facility: CLINIC | Age: 78
End: 2023-02-07
Payer: MEDICARE

## 2023-02-07 ENCOUNTER — APPOINTMENT (OUTPATIENT)
Dept: CARDIOLOGY | Facility: CLINIC | Age: 78
End: 2023-02-07

## 2023-02-07 ENCOUNTER — NON-APPOINTMENT (OUTPATIENT)
Age: 78
End: 2023-02-07

## 2023-02-07 VITALS — SYSTOLIC BLOOD PRESSURE: 136 MMHG | DIASTOLIC BLOOD PRESSURE: 82 MMHG | HEART RATE: 68 BPM

## 2023-02-07 VITALS
WEIGHT: 135 LBS | BODY MASS INDEX: 27.27 KG/M2 | DIASTOLIC BLOOD PRESSURE: 84 MMHG | SYSTOLIC BLOOD PRESSURE: 149 MMHG | OXYGEN SATURATION: 97 % | HEART RATE: 67 BPM

## 2023-02-07 VITALS — SYSTOLIC BLOOD PRESSURE: 132 MMHG | HEART RATE: 66 BPM | DIASTOLIC BLOOD PRESSURE: 86 MMHG

## 2023-02-07 DIAGNOSIS — Z90.10 ACQUIRED ABSENCE OF UNSPECIFIED BREAST AND NIPPLE: ICD-10-CM

## 2023-02-07 DIAGNOSIS — E78.00 PURE HYPERCHOLESTEROLEMIA, UNSPECIFIED: ICD-10-CM

## 2023-02-07 DIAGNOSIS — M47.819 SPONDYLOSIS W/OUT MYELOPATHY OR RADICULOPATHY, SITE UNSPECIFIED: ICD-10-CM

## 2023-02-07 DIAGNOSIS — R55 SYNCOPE AND COLLAPSE: ICD-10-CM

## 2023-02-07 DIAGNOSIS — E03.9 HYPOTHYROIDISM, UNSPECIFIED: ICD-10-CM

## 2023-02-07 DIAGNOSIS — R73.09 OTHER ABNORMAL GLUCOSE: ICD-10-CM

## 2023-02-07 PROCEDURE — 36415 COLL VENOUS BLD VENIPUNCTURE: CPT

## 2023-02-07 PROCEDURE — 99205 OFFICE O/P NEW HI 60 MIN: CPT

## 2023-02-07 PROCEDURE — 93000 ELECTROCARDIOGRAM COMPLETE: CPT

## 2023-02-07 NOTE — REVIEW OF SYSTEMS
[Syncope] : syncope [Heartburn] : heartburn [Constipation] : constipation [Negative] : Heme/Lymph [Weight Gain (___ Lbs)] : no recent weight gain [Feeling Fatigued] : not feeling fatigued [Weight Loss (___ Lbs)] : no recent weight loss [Dyspnea on exertion] : not dyspnea during exertion [Chest Discomfort] : no chest discomfort [Lower Ext Edema] : no extremity edema [Palpitations] : no palpitations [Change in Appetite] : no change in appetite [Change In The Stool] : no change in stool [Dysphagia] : no dysphagia [Diarrhea] : diarrhea [Confusion] : no confusion was observed [Memory Lapses Or Loss] : no memory lapses or loss [Depression] : no depression [Anxiety] : no anxiety [Under Stress] : not under stress [FreeTextEntry5] : see HPI [FreeTextEntry7] : Chronic constipation always needing a laxative [FreeTextEntry9] : Multiple back surgeries

## 2023-02-07 NOTE — HISTORY OF PRESENT ILLNESS
[FreeTextEntry1] : February 7, 2023.  Pleasant 77-year-old woman, active working out of her house without any prior cardiac history.  Does have mild hyperlipidemia and hypothyroidism.  2 episodes now the first 1 about 2 months ago where she felt lightheaded and that she had trouble breathing "felt like she had to breathe from the bottom of her stomach" and then she passed out and hit her head mildly.  The family called at [facility] (EMS) but she refused to go to the hospital.  She seemed to do fine since then until an episode last week which was similar where she got lightheaded and had to sit down again felt like she had trouble breathing.  The family said her face turned green on one of the episodes.  Again she refused to go to the hospital and has been fine since but her son insisted she see a cardiologist.  She has never had other syncopal episodes or palpitations or arrhythmias.  She has had multiple surgeries including mastectomy, thyroid and parathyroid surgery, left shoulder replacement, and a few back surgeries without any problems.  No smoking, no hypertension, no diabetes, and no family history of coronary artery disease.  She takes rosuvastatin every other day 10 mg and Synthroid and a PPI.  She works in her house doing sewing and other things and is up and down the stairs all day without any problems.  Her EKG is sinus rhythm at 67 and for the most part unremarkable.  1 time in the remote past may be 10 years ago or more she had a stress test and a Holter monitor from Dr. Wayne Phan that was normal but it was not for symptoms that were anything like this.

## 2023-02-07 NOTE — REASON FOR VISIT
[FreeTextEntry1] : 77-year-old woman with 2 syncopal or near syncopal episodes in the last 2 months.

## 2023-02-07 NOTE — PHYSICAL EXAM
[Well Developed] : well developed [Well Nourished] : well nourished [No Acute Distress] : no acute distress [Normal Conjunctiva] : normal conjunctiva [Normal Venous Pressure] : normal venous pressure [No Carotid Bruit] : no carotid bruit [Normal S1, S2] : normal S1, S2 [No Rub] : no rub [No Gallop] : no gallop [Clear Lung Fields] : clear lung fields [Good Air Entry] : good air entry [No Respiratory Distress] : no respiratory distress  [Soft] : abdomen soft [Non Tender] : non-tender [No Masses/organomegaly] : no masses/organomegaly [Normal Bowel Sounds] : normal bowel sounds [Normal Gait] : normal gait [No Edema] : no edema [No Cyanosis] : no cyanosis [No Clubbing] : no clubbing [No Varicosities] : no varicosities [Normal Radial B/L] : normal radial B/L [Normal PT B/L] : normal PT B/L [Normal DP B/L] : normal DP B/L [No Rash] : no rash [No Skin Lesions] : no skin lesions [Moves all extremities] : moves all extremities [No Focal Deficits] : no focal deficits [Normal Speech] : normal speech [Alert and Oriented] : alert and oriented [Normal memory] : normal memory [de-identified] : Possibly 1/6 to 2/6 aortic insufficiency murmur at the base

## 2023-02-08 LAB
ALBUMIN SERPL ELPH-MCNC: 4.6 G/DL
ALP BLD-CCNC: 81 U/L
ALT SERPL-CCNC: 20 U/L
ANION GAP SERPL CALC-SCNC: 13 MMOL/L
AST SERPL-CCNC: 31 U/L
BASOPHILS # BLD AUTO: 0.07 K/UL
BASOPHILS NFR BLD AUTO: 1 %
BILIRUB SERPL-MCNC: 0.2 MG/DL
BUN SERPL-MCNC: 12 MG/DL
CALCIUM SERPL-MCNC: 9 MG/DL
CHLORIDE SERPL-SCNC: 95 MMOL/L
CHOLEST SERPL-MCNC: 219 MG/DL
CO2 SERPL-SCNC: 27 MMOL/L
CREAT SERPL-MCNC: 0.81 MG/DL
EGFR: 75 ML/MIN/1.73M2
EOSINOPHIL # BLD AUTO: 0.09 K/UL
EOSINOPHIL NFR BLD AUTO: 1.2 %
ESTIMATED AVERAGE GLUCOSE: 117 MG/DL
GLUCOSE SERPL-MCNC: 93 MG/DL
HBA1C MFR BLD HPLC: 5.7 %
HCT VFR BLD CALC: 37.8 %
HDLC SERPL-MCNC: 86 MG/DL
HGB BLD-MCNC: 12.3 G/DL
IMM GRANULOCYTES NFR BLD AUTO: 0.3 %
LDLC SERPL CALC-MCNC: 113 MG/DL
LYMPHOCYTES # BLD AUTO: 2.41 K/UL
LYMPHOCYTES NFR BLD AUTO: 33.3 %
MAN DIFF?: NORMAL
MCHC RBC-ENTMCNC: 26.8 PG
MCHC RBC-ENTMCNC: 32.5 GM/DL
MCV RBC AUTO: 82.4 FL
MONOCYTES # BLD AUTO: 0.42 K/UL
MONOCYTES NFR BLD AUTO: 5.8 %
NEUTROPHILS # BLD AUTO: 4.23 K/UL
NEUTROPHILS NFR BLD AUTO: 58.4 %
NONHDLC SERPL-MCNC: 132 MG/DL
NT-PROBNP SERPL-MCNC: 199 PG/ML
PLATELET # BLD AUTO: 198 K/UL
POTASSIUM SERPL-SCNC: 4 MMOL/L
PROT SERPL-MCNC: 7 G/DL
RBC # BLD: 4.59 M/UL
RBC # FLD: 15 %
SODIUM SERPL-SCNC: 136 MMOL/L
TRIGL SERPL-MCNC: 95 MG/DL
TSH SERPL-ACNC: 0.47 UIU/ML
WBC # FLD AUTO: 7.24 K/UL

## 2023-03-08 ENCOUNTER — NON-APPOINTMENT (OUTPATIENT)
Age: 78
End: 2023-03-08

## 2023-03-08 DIAGNOSIS — R06.02 SHORTNESS OF BREATH: ICD-10-CM

## 2023-06-06 ENCOUNTER — APPOINTMENT (OUTPATIENT)
Dept: CARDIOLOGY | Facility: CLINIC | Age: 78
End: 2023-06-06

## 2023-08-14 ENCOUNTER — APPOINTMENT (OUTPATIENT)
Dept: SPINE | Facility: CLINIC | Age: 78
End: 2023-08-14

## 2023-08-17 ENCOUNTER — APPOINTMENT (OUTPATIENT)
Dept: SPINE | Facility: CLINIC | Age: 78
End: 2023-08-17

## 2024-04-01 ENCOUNTER — APPOINTMENT (OUTPATIENT)
Dept: PULMONOLOGY | Facility: CLINIC | Age: 79
End: 2024-04-01
Payer: MEDICARE

## 2024-04-01 VITALS
TEMPERATURE: 96.7 F | DIASTOLIC BLOOD PRESSURE: 70 MMHG | WEIGHT: 128 LBS | HEART RATE: 86 BPM | HEIGHT: 56.69 IN | SYSTOLIC BLOOD PRESSURE: 138 MMHG | BODY MASS INDEX: 28 KG/M2 | OXYGEN SATURATION: 96 %

## 2024-04-01 DIAGNOSIS — R05.9 COUGH, UNSPECIFIED: ICD-10-CM

## 2024-04-01 DIAGNOSIS — Z23 ENCOUNTER FOR IMMUNIZATION: ICD-10-CM

## 2024-04-01 PROCEDURE — 99203 OFFICE O/P NEW LOW 30 MIN: CPT

## 2024-04-01 RX ORDER — SODIUM CHLORIDE 2.65%
2.65 AEROSOL, SPRAY (ML) NASAL TWICE DAILY
Qty: 1 | Refills: 2 | Status: ACTIVE | COMMUNITY
Start: 2024-04-01 | End: 1900-01-01

## 2024-04-01 RX ORDER — DOXYCYCLINE HYCLATE 100 MG/1
100 CAPSULE ORAL
Qty: 14 | Refills: 0 | Status: ACTIVE | COMMUNITY
Start: 2024-04-01 | End: 1900-01-01

## 2024-04-01 RX ORDER — FLUTICASONE PROPIONATE 50 UG/1
50 SPRAY, METERED NASAL DAILY
Qty: 1 | Refills: 1 | Status: ACTIVE | COMMUNITY
Start: 2024-04-01 | End: 1900-01-01

## 2024-04-01 RX ORDER — PREDNISONE 20 MG/1
20 TABLET ORAL DAILY
Qty: 5 | Refills: 0 | Status: ACTIVE | COMMUNITY
Start: 2024-04-01 | End: 1900-01-01

## 2024-04-01 NOTE — PHYSICAL EXAM
[No Acute Distress] : no acute distress [Well Developed] : well developed [Well Nourished] : well nourished [Normal Oropharynx] : normal oropharynx [I] : Mallampati Class: I [Supple] : supple [Normal Appearance] : normal appearance [No Neck Mass] : no neck mass [Normal S1, S2] : normal s1, s2 [Normal Rate/Rhythm] : normal rate/rhythm [No Murmurs] : no murmurs [No Resp Distress] : no resp distress [No Abnormalities] : no abnormalities [Clear to Auscultation Bilaterally] : clear to auscultation bilaterally [Benign] : benign [Soft] : soft [No Masses] : no masses [No Clubbing] : no clubbing [No Edema] : no edema [No Focal Deficits] : no focal deficits [Oriented x3] : oriented x3 [Normal Affect] : normal affect

## 2024-04-01 NOTE — HISTORY OF PRESENT ILLNESS
[Never] : never [TextBox_4] : 78 year old patient presents for evaluation of  dyspnea for a few months after returning from [place] when she went for vacation. She states she felt ill immediately after returning 1.5 months ago.  She has significant cough, unable to expectorate.  She has poor taste   CT chest 03/13/2024 New nodular opacity projecting at the right midlung. No consolidation. No pleural effusion or pneumothorax.     CXR reviewed She has no chest pain, no wheezino  history of obstructive airway disease , no smoking, no pedal edema She has some postnasal drip , ear and nasal congestion,     She reports fatigue and weakness.  No weight loss  She has taken steroid, oral antibiotic, nasal spray  Took antibiotics and steroid about 2-4 weeks ago     Primary doctor is  Dr Phan     PSH:  mastectomy, breast cancer  No RT, 2010 left shoulder replace hernia lower back surgery, hardware  thyroidectomy, not cancer  for goiter   PMH:    Breast cancer  HLD    HTN  :depression  hypothyroid   SH   never smoker      ETOH:  none     Occupation: retired, worked as dress maker   No exposure to chemicals, dust, asbestos, mold       ALLERGY:   NKDA   allergic to   Reaction is   environmental/seasonal allergy:       Review of Systems:   No rash, skin problems No dry eyes no mouth ulcers no dry mouth      no obstructive airway disease  no pneumonia no wheeze no lung cancer   no CAD no MI no chest pain no murmur no CHF  no edema   no peptic ulcer or gastritis no GERD no abdominal pain no liver disease   no Diabetes   no bleeding   no DVT or PE   no kidney disease   no stroke no seizure      no  history of COVID-19 infection

## 2024-04-01 NOTE — DISCUSSION/SUMMARY
[FreeTextEntry1] : 78 year old woman who has had symptoms for about 6 weeks after returning from trip to [facility]  She has nasal congestion and postnasal drip   She has bad taste in mouth, some nasal congestion dry mouth   Her lungs are clear  A CXR demonstrated a small right mid zone opacity, reportedly new She has had right sided sinus surgery in 2005 when she had a dental implant   PLAN  Obtain NC CT chest .  Obtain CT sinuses  expectorant   start on saline nasal wash, Flonase  treat with course of oral prednisone 20 mg for 1 week, doxycycline BID for 5 days  She is hesitant to use prednisone  return for PFT,   reassess  Referred to ENT as well  Total time spent : 40 minutes Including: Preparation prior to visit - Reviewing prior record, results of tests and Consultation Reports as applicable Conducting an appropriate H & P during today's encounter  reviewing all available CT chest exams.  demonstrating images to pt as appropriate Counseling patient  Note completion  med renewal discussing differential diagnosis     Montana Diaz MD

## 2024-04-28 NOTE — PHYSICAL EXAM
[No Acute Distress] : no acute distress [Well Nourished] : well nourished [Well Developed] : well developed [Normal Oropharynx] : normal oropharynx [I] : Mallampati Class: I [Normal Appearance] : normal appearance [No Neck Mass] : no neck mass [Supple] : supple [Normal Rate/Rhythm] : normal rate/rhythm [No Murmurs] : no murmurs [Normal S1, S2] : normal s1, s2 [No Resp Distress] : no resp distress [Clear to Auscultation Bilaterally] : clear to auscultation bilaterally [No Abnormalities] : no abnormalities [Benign] : benign [No Masses] : no masses [Soft] : soft [No Clubbing] : no clubbing [No Edema] : no edema [No Focal Deficits] : no focal deficits [Oriented x3] : oriented x3 [Normal Affect] : normal affect

## 2024-05-01 ENCOUNTER — APPOINTMENT (OUTPATIENT)
Dept: PULMONOLOGY | Facility: CLINIC | Age: 79
End: 2024-05-01
Payer: MEDICARE

## 2024-05-01 VITALS
SYSTOLIC BLOOD PRESSURE: 140 MMHG | WEIGHT: 127 LBS | HEART RATE: 90 BPM | TEMPERATURE: 97.6 F | DIASTOLIC BLOOD PRESSURE: 80 MMHG | OXYGEN SATURATION: 99 % | BODY MASS INDEX: 27.79 KG/M2

## 2024-05-01 DIAGNOSIS — R91.1 SOLITARY PULMONARY NODULE: ICD-10-CM

## 2024-05-01 DIAGNOSIS — R05.8 OTHER SPECIFIED COUGH: ICD-10-CM

## 2024-05-01 PROCEDURE — 94060 EVALUATION OF WHEEZING: CPT

## 2024-05-01 PROCEDURE — 99214 OFFICE O/P EST MOD 30 MIN: CPT | Mod: 25

## 2024-05-01 PROCEDURE — 94727 GAS DIL/WSHOT DETER LNG VOL: CPT

## 2024-05-01 PROCEDURE — 94729 DIFFUSING CAPACITY: CPT

## 2024-05-01 NOTE — DISCUSSION/SUMMARY
[FreeTextEntry1] : 78 year old woman who has had symptoms for about 6 weeks after returning from trip to [facility]  She has nasal congestion and postnasal drip   She has bad taste in mouth, some nasal congestion dry mouth   Her lungs are clear  A CXR demonstrated a small right mid zone opacity, reportedly new She has had right sided sinus surgery in 2005 when she had a dental implant   She underwent NC CT chest .and CT sinuses.  She has minor sinus findings and deviated nasal septum  CT chest demonstrated cylindrical bronchiectasis and large airway inflammation expectorant   started on saline nasal wash, Flonase  Also I treated with course of oral prednisone 20 mg for 1 week, doxycycline BID for 5 days   No obstructive airway disease on PFT She is improved but now more concerned about postnasal drip and ear discomfort  I reviewed Ct chest.  bronchiectasis is mild.  she denies any sputum from Chest.  Will observe  per PFT, no need for bronchodilator  Referred to ENT as well, await input, has upcoming appointment May use nasal sprays  Total time spent : 30 minutes Including: Preparation prior to visit - Reviewing prior record, results of tests and Consultation Reports as applicable Conducting an appropriate H & P during today's encounter  reviewing all available CT chest exams.  demonstrating images to pt as appropriate Counseling patient  Note completion  med renewal discussing differential diagnosis     Montana Diaz MD

## 2024-05-01 NOTE — REASON FOR VISIT
[Consultation] : a consultation [Cough] : cough [Follow-Up] : a follow-up visit [TextBox_44] : nasal congestion

## 2024-05-01 NOTE — PROCEDURE
[FreeTextEntry1] : PFT  normal spirometry, lung volumes and diffusion    Montana Diaz MD [facility]

## 2024-05-02 ENCOUNTER — RX RENEWAL (OUTPATIENT)
Age: 79
End: 2024-05-02

## 2024-05-02 RX ORDER — FLUTICASONE PROPIONATE 50 UG/1
50 SPRAY, METERED NASAL DAILY
Qty: 48 | Refills: 0 | Status: ACTIVE | COMMUNITY
Start: 2024-04-01 | End: 1900-01-01

## 2024-05-08 ENCOUNTER — APPOINTMENT (OUTPATIENT)
Dept: OTOLARYNGOLOGY | Facility: CLINIC | Age: 79
End: 2024-05-08
Payer: MEDICARE

## 2024-05-08 VITALS
SYSTOLIC BLOOD PRESSURE: 121 MMHG | WEIGHT: 127 LBS | DIASTOLIC BLOOD PRESSURE: 81 MMHG | HEART RATE: 74 BPM | BODY MASS INDEX: 27.78 KG/M2 | HEIGHT: 56.7 IN | OXYGEN SATURATION: 96 %

## 2024-05-08 DIAGNOSIS — J34.89 OTHER SPECIFIED DISORDERS OF NOSE AND NASAL SINUSES: ICD-10-CM

## 2024-05-08 DIAGNOSIS — J02.9 ACUTE PHARYNGITIS, UNSPECIFIED: ICD-10-CM

## 2024-05-08 DIAGNOSIS — J32.9 CHRONIC SINUSITIS, UNSPECIFIED: ICD-10-CM

## 2024-05-08 DIAGNOSIS — Z86.79 PERSONAL HISTORY OF OTHER DISEASES OF THE CIRCULATORY SYSTEM: ICD-10-CM

## 2024-05-08 DIAGNOSIS — H92.03 OTALGIA, BILATERAL: ICD-10-CM

## 2024-05-08 PROCEDURE — 31231 NASAL ENDOSCOPY DX: CPT

## 2024-05-08 PROCEDURE — 99204 OFFICE O/P NEW MOD 45 MIN: CPT | Mod: 25

## 2024-05-08 RX ORDER — IPRATROPIUM BROMIDE 42 UG/1
0.06 SPRAY NASAL
Qty: 1 | Refills: 3 | Status: ACTIVE | COMMUNITY
Start: 2024-05-08 | End: 1900-01-01

## 2024-05-08 NOTE — CONSULT LETTER
[Dear  ___] : Dear  [unfilled], [( Thank you for referring [unfilled] for consultation for _____ )] : Thank you for referring [unfilled] for consultation for [unfilled] [Please see my note below.] : Please see my note below. [Consult Closing:] : Thank you very much for allowing me to participate in the care of this patient.  If you have any questions, please do not hesitate to contact me. [Sincerely,] : Sincerely, [FreeTextEntry3] : Adonay Swift MD Sinus & Endoscopic Skull Base Surgery Department of Otolaryngology- Head & Neck Surgery [facility]  Phone: [phone] Fax: [phone]

## 2024-05-08 NOTE — HISTORY OF PRESENT ILLNESS
[de-identified] : 77 y/o F presents for evaluation of constant rhinorrhea Referred by Pulm, Dr. Montana Diaz - s/p CT Sinus Symptoms present for the past 2-3 months Currently reports bilateral otalgia, sore throat, NO sense of taste Denies nasal congestion, difficulty breathing, PND, facial pain and pressure, epistaxis, recent fevers or chills Sense of smell is good No recurrent sinus infection Using Saline nasal spray once a day, at bedtime **Has been on antibiotics, steroids and other nasal sprays in the past with NO relief  PMH: HTN, High cholesterol, Right Breast CA, Depression, broken neck for the past 6 years (refuses surgery) PSH: Right mastectomy, thyroidectomy (on Synthroid)    CT Sinus 4/02/24 at [facility] Impression: Nasal septal deviation, convexity to the Left. Clear paanasal sinuses  CT Chest 4/02/24 at [facility] Impression: Mild large airway inflammation with unchanged cylindrical bronchiectasis in the lower lobes. No acute bronchiolitis or consolidative pneumonia identified. Benign lung nodules have been stable since 2020

## 2024-05-13 ENCOUNTER — APPOINTMENT (OUTPATIENT)
Dept: ORTHOPEDIC SURGERY | Facility: CLINIC | Age: 79
End: 2024-05-13
Payer: MEDICARE

## 2024-05-13 VITALS
WEIGHT: 127 LBS | HEIGHT: 59 IN | SYSTOLIC BLOOD PRESSURE: 135 MMHG | DIASTOLIC BLOOD PRESSURE: 78 MMHG | BODY MASS INDEX: 25.6 KG/M2 | HEART RATE: 78 BPM

## 2024-05-13 DIAGNOSIS — M25.511 PAIN IN RIGHT SHOULDER: ICD-10-CM

## 2024-05-13 PROCEDURE — 73030 X-RAY EXAM OF SHOULDER: CPT | Mod: RT

## 2024-05-13 PROCEDURE — 99204 OFFICE O/P NEW MOD 45 MIN: CPT

## 2024-05-13 RX ORDER — NABUMETONE 500 MG/1
500 TABLET, FILM COATED ORAL
Qty: 60 | Refills: 0 | Status: ACTIVE | COMMUNITY
Start: 2024-05-13 | End: 1900-01-01

## 2024-05-13 NOTE — DISCUSSION/SUMMARY
[de-identified] : The patient has osteoarthritis of the right shoulder.  She is symptomatic and has pain and stiffness.  I have discussed the pathology, natural history and treatment options with her.  She is started on a course of nabumetone.  Medication risks have been reviewed.  She is referred for physical therapy.  She can be reevaluated in 1 month.

## 2024-05-13 NOTE — PHYSICAL EXAM
[Rad] : radial 2+ and symmetric bilaterally [Normal] : Alert and in no acute distress [Poor Appearance] : well-appearing [Acute Distress] : not in acute distress [Obese] : not obese [de-identified] : The patient has no respiratory distress. Mood and affect are normal. The patient is alert and oriented to person, place and time. Examination of the cervical spine demonstrates no deformity. There is tenderness of the right paracervical muscles and the right trapezius muscle. There is mild muscle spasm. Cervical spine range of motion is right lateral rotation of 40, left lateral rotation of 40, extension of 45 and flexion of 45. Upper extremity neurologic exam is intact with regard to sensation. Motor function is 5 over 5 in all groups in the upper extremities. Deep tendon reflexes are 2+ and equal at the biceps, triceps and brachial radialis. Examination of the right shoulder demonstrates no deformity. The skin is intact. There is no erythema. There is tenderness anteriorly. Impingement sign is positive There is no instability. Drop arm test is negative. Empty can test is negative. Liftoff test is negative. [place] test is negative.  She has elevation of 115 degrees, external rotation of 40 and internal rotation to the level of the sacrum.  The elbows are stable.  There is no lymphedema. [de-identified] : AP, transscapular and axillary x-rays of the right shoulder demonstrate no fracture or dislocation.  She has moderate degenerative changes of the shoulder.

## 2024-05-13 NOTE — HISTORY OF PRESENT ILLNESS
[de-identified] : 78-year-old RHD female presents for initial evaluation of atraumatic right shoulder pain x 1 year. She complains of sharp pain over the anterior shoulder worse with lifting, overhead movements and sleeping on the right shoulder. She has tried Advil and Salonpas with mild relief. Denies neck pain or paresthesias. Denies prior shoulder injuries.

## 2024-05-26 ENCOUNTER — NON-APPOINTMENT (OUTPATIENT)
Age: 79
End: 2024-05-26

## 2024-06-03 ENCOUNTER — APPOINTMENT (OUTPATIENT)
Dept: ORTHOPEDIC SURGERY | Facility: CLINIC | Age: 79
End: 2024-06-03
Payer: MEDICARE

## 2024-06-03 VITALS — WEIGHT: 127 LBS | HEIGHT: 59 IN | BODY MASS INDEX: 25.6 KG/M2

## 2024-06-03 PROCEDURE — 20610 DRAIN/INJ JOINT/BURSA W/O US: CPT | Mod: LT

## 2024-06-03 PROCEDURE — 99215 OFFICE O/P EST HI 40 MIN: CPT | Mod: 25

## 2024-06-03 PROCEDURE — 73564 X-RAY EXAM KNEE 4 OR MORE: CPT | Mod: LT

## 2024-06-03 NOTE — DISCUSSION/SUMMARY
[de-identified] : I went over the pathophysiology of the patient's symptoms in great detail with the patient. I discussed the underlying pathophysiology of the patient's condition in great detail with the patient. I went over the patient's x-rays with them in great detail. The patient elected to receive a cortisone injection into her left knee today, and tolerated it well. I instructed the patient on ROM exercises, and told them to take it easy. The use of ice and rest was reviewed with the patient. The patient may resume activities tomorrow. Viscosupplementation was discussed as a solution to the patient's symptoms and a booklet with information was provided.   All of their questions were answered. They understand and consent to the plan.   FU in a couple weeks for the left knee gel injection.

## 2024-06-03 NOTE — PHYSICAL EXAM
[de-identified] : Constitutional o Appearance : well-nourished, well developed, alert, in no acute distress  Head and Face o Head :  Inspection : atraumatic, normocephalic o Face :  Inspection : no visible rash or discoloration Respiratory o Respiratory Effort: breathing unlabored  Neurologic o Mental Status Examination :  Orientation : alert and oriented X 3 Psychiatric o Mood and Affect: mood normal, affect appropriate Cardiovascular o Observation/Palpation : - no swelling Lymphatic o Additional Nodes : No palpable lymph nodes present  Right Lower Extremity o Buttock : no tenderness, swelling or deformities  o Right Hip :  Inspection/Palpation : no tenderness, swelling or deformities  Range of Motion : full and painless in all planes, no crepitance  Stability : joint stability intact  Strength : extension, flexion, adduction, abduction, internal rotation and external rotation 5/5   o Right Knee :  Inspection/Palpation : no tenderness to palpation, no swelling  Range of Motion : active flexion and extension full and painless, no crepitance  Stability : no valgus or varus instability present on provocative testing  Strength : flexion and extension 5/5  Tests and Signs : negative Anterior Drawer, negative Lachman, negative Kecia  Left Lower Extremity o Buttock : no tenderness, swelling or deformities  o Left Hip :  Inspection/Palpation : no tenderness, no swelling or deformities  Range of Motion : full and painless in all planes, no crepitance  Stability : joint stability intact  Strength : extension, flexion, adduction, abduction, internal rotation and external rotation 5/5  o Left Knee :  Inspection/Palpation : sig medial compartment tenderness to palpation, no medial facet tenderness, no swelling  Range of Motion : 0-140 active flexion and extension full and painless, no crepitance  Stability : no valgus or varus instability present on provocative testing  Strength : flexion and extension 5/5  Tests and Signs : negative Anterior Drawer, negative Lachman, negative Kecia  Gait and Station: Gait: antalgic gait, no foot drop, very limited core strength no significant extremity swelling or lymphedema, good proprioception and balance  Radiology Results 6/3/2024 o Left Knee : Standing AP, lateral, tunnel, and skyline views of the knee were obtained and reveal moderate medial and patellofemoral arthritis   o Knee injection : Indication-left knee osteoarthritis, Anatomic location- left intra-articular joint space, Spray - area was sterilized with Betadine and alcohol and anesthetized with Ethyl Chloride , needle used-20G, Medications given- 5cc's lidocaine, 0.5cc's kenalog, 0.5 cc's dexamethasone. The patient tolerated the procedure well.

## 2024-06-03 NOTE — HISTORY OF PRESENT ILLNESS
[de-identified] : 78 year old female presents complaining of left knee pain. She presents wearing a left knee brace. She notes a week ago she twisted her left knee. She notes she went to Urgent Care and X-rays were obtained. The x-rays are not standing. She has never had left knee problems. She notes her left knee is better. She denies any swelling or buckling. Patient states that she has difficulty when ascending and descending stairs.   XR KNEE 3 VIEWS LEFT done on 5/27/2024  IMPRESSION: 1. No acute osseous abnormalities. 2. Sig medial and patellofemoral arthritis  --- End of Report ---

## 2024-06-03 NOTE — ADDENDUM
[FreeTextEntry1] : I, AUTUMN GAY, acted solely as a scribe for Dr. Raúl Izaguirre on this date 06/03/2024.  All medical record entries made by the Scribe were at my, Dr. Raúl Izaguirre, direction and personally dictated by me on 06/03/2024. I have reviewed the chart and agree that the record accurately reflects my personal performance of the history, physical exam, assessment and plan. I have also personally directed, reviewed, and agreed with the chart.

## 2024-06-06 ENCOUNTER — APPOINTMENT (OUTPATIENT)
Dept: ORTHOPEDIC SURGERY | Facility: CLINIC | Age: 79
End: 2024-06-06

## 2024-06-06 ENCOUNTER — EMERGENCY (EMERGENCY)
Facility: HOSPITAL | Age: 79
LOS: 1 days | Discharge: HOME | End: 2024-06-06
Attending: EMERGENCY MEDICINE | Admitting: STUDENT IN AN ORGANIZED HEALTH CARE EDUCATION/TRAINING PROGRAM
Payer: MEDICARE

## 2024-06-06 VITALS
OXYGEN SATURATION: 97 % | HEIGHT: 59 IN | TEMPERATURE: 98 F | HEART RATE: 81 BPM | RESPIRATION RATE: 20 BRPM | SYSTOLIC BLOOD PRESSURE: 182 MMHG | WEIGHT: 126.99 LBS | DIASTOLIC BLOOD PRESSURE: 79 MMHG

## 2024-06-06 PROCEDURE — 73564 X-RAY EXAM KNEE 4 OR MORE: CPT | Mod: 26,LT

## 2024-06-06 PROCEDURE — 99284 EMERGENCY DEPT VISIT MOD MDM: CPT

## 2024-06-06 RX ORDER — CELECOXIB 200 MG/1
200 CAPSULE ORAL
Qty: 30 | Refills: 3 | Status: ACTIVE | COMMUNITY
Start: 2024-06-06 | End: 1900-01-01

## 2024-06-06 RX ORDER — ACETAMINOPHEN 500 MG
650 TABLET ORAL ONCE
Refills: 0 | Status: COMPLETED | OUTPATIENT
Start: 2024-06-06 | End: 2024-06-06

## 2024-06-06 RX ORDER — LIDOCAINE 4 G/100G
1 CREAM TOPICAL ONCE
Refills: 0 | Status: COMPLETED | OUTPATIENT
Start: 2024-06-06 | End: 2024-06-06

## 2024-06-06 RX ADMIN — LIDOCAINE 1 PATCH: 4 CREAM TOPICAL at 18:06

## 2024-06-06 RX ADMIN — Medication 650 MILLIGRAM(S): at 18:06

## 2024-06-06 NOTE — ED ADULT NURSE NOTE - OBJECTIVE STATEMENT
Pt s/p twisting her left knee. co pain. pt denies falling. pt medicated for her symptoms awaiting xray.

## 2024-06-06 NOTE — ED ADULT NURSE NOTE - NSFALLRISKINTERV_ED_ALL_ED

## 2024-06-06 NOTE — ED PROVIDER NOTE - CLINICAL SUMMARY MEDICAL DECISION MAKING FREE TEXT BOX
Impression  Will get baseline x-ray to rule out large effusion versus fracture  Will treat pain with Tylenol and lidocaine patch on lateral aspect of leg  Possibly sciatic pain from nerve impingement  Patient will contact her doctor tomorrow to get an appointment  Will wrap knee and ice and Ace wrap and discharged home  Patient states she is safe at home and will be able to manage

## 2024-06-06 NOTE — ED PROVIDER NOTE - NSICDXPASTSURGICALHX_GEN_ALL_CORE_FT
PAST SURGICAL HISTORY:  History of Mastectomy  and reconstruction right 8/09     S/P Arthroscopy right shoulder

## 2024-06-06 NOTE — ED PROVIDER NOTE - NSICDXPASTMEDICALHX_GEN_ALL_CORE_FT
PAST MEDICAL HISTORY:  Breast Cancer     Chronic midline low back pain with right-sided sciatica     Hyperparathyroidism     Hypothyroidism, unspecified type     Nodular Goiter     Odontoid fracture, sequela

## 2024-06-06 NOTE — ED PROVIDER NOTE - PHYSICAL EXAMINATION
Physical exam  Well-appearing female in no respiratory distress  Mildly hypertensive to 182/79, afebrile  Clear to auscultation bilaterally  S1-S2 no murmurs rubs or gallops  Extremities left knee mildly swollen, joint stable, no large effusion, full range of motion of knee with pain

## 2024-06-06 NOTE — ED PROVIDER NOTE - PATIENT PORTAL LINK FT
You can access the FollowMyHealth Patient Portal offered by [facility] by registering at the following website: [email or web address]. By joining [facility]’s FollowMyHealth portal, you will also be able to view your health information using other applications (apps) compatible with our system.

## 2024-06-06 NOTE — ED PROVIDER NOTE - OBJECTIVE STATEMENT
78-year-old female with history of hypertension, hypothyroidism presents for left knee pain described as lateral leg shooting pain.   2 weeks ago twisted her knee.  Last week went to presumably an orthopedic surgeon and had a cortisone injection done on the anterior aspect of the knee.   for 1 day improved and then since then has had lateral shooting pain.   is able to ambulate but with pain.  Patient lives by herself.  Denies any fevers.  Is able to move the knee but with pain.

## 2024-06-06 NOTE — ED ADULT TRIAGE NOTE - CHIEF COMPLAINT QUOTE
Pt c/o L leg pain. States she had cortisone shot on 6/3, c/o worsening pain to L knee, thigh, lower back. No fevers/chills. PMHx Breast CA in remission, Hypothyroidism

## 2024-06-06 NOTE — ED PROVIDER NOTE - NSFOLLOWUPINSTRUCTIONS_ED_ALL_ED_FT
Please apply ice to knee, use compression bandaging    Return to ER for fever, inability to move knee or any other symptoms in which you feel require immediate care.    I have attached your xray results    Please call your doctor for appointment this week

## 2024-06-10 ENCOUNTER — APPOINTMENT (OUTPATIENT)
Dept: ORTHOPEDIC SURGERY | Facility: CLINIC | Age: 79
End: 2024-06-10
Payer: MEDICARE

## 2024-06-10 VITALS — BODY MASS INDEX: 25.6 KG/M2 | WEIGHT: 127 LBS | HEIGHT: 59 IN

## 2024-06-10 DIAGNOSIS — M76.32 ILIOTIBIAL BAND SYNDROME, LEFT LEG: ICD-10-CM

## 2024-06-10 DIAGNOSIS — M23.91 UNSPECIFIED INTERNAL DERANGEMENT OF RIGHT KNEE: ICD-10-CM

## 2024-06-10 DIAGNOSIS — M23.92 UNSPECIFIED INTERNAL DERANGEMENT OF LEFT KNEE: ICD-10-CM

## 2024-06-10 DIAGNOSIS — M17.12 UNILATERAL PRIMARY OSTEOARTHRITIS, LEFT KNEE: ICD-10-CM

## 2024-06-10 PROCEDURE — 99214 OFFICE O/P EST MOD 30 MIN: CPT

## 2024-06-10 NOTE — DISCUSSION/SUMMARY
[de-identified] : I went over the pathophysiology of the patient's symptoms in great detail with the patient. We discussed the use of ice, Tylenol and anti-inflammatories to relieve pain. We are requesting authorization for an MRI of the patients LEFT knee to rule out lateral meniscus tear.   All of their questions were answered. They understand and consent to the plan.   FU after MRI

## 2024-06-10 NOTE — ADDENDUM
[FreeTextEntry1] : I, AUTUMN GAY, acted solely as a scribe for Dr. Raúl Izaguirre on this date 06/10/2024.  All medical record entries made by the Scribe were at my, Dr. Raúl Izaguirre, direction and personally dictated by me on 06/10/2024. I have reviewed the chart and agree that the record accurately reflects my personal performance of the history, physical exam, assessment and plan. I have also personally directed, reviewed, and agreed with the chart.

## 2024-06-10 NOTE — HISTORY OF PRESENT ILLNESS
[de-identified] : 78 year old female presents complaining of left knee pain. She had a left knee cortisone injection on 6/3/2024 and only worked for two days. She went to the ER on 6/6/2024 where X-rays of her left knee were obtained. She notes her left knee was "paralyzed" a couple days ago. She notes she was not able to move her left knee after the cortisone injection. She denies any swelling or buckling. She is not using a cane at this time. She presents wearing a left knee compression sleeve. She notes the left knee pain traveled up to her left groin.   Radiology Results 6/3/2024 o Left Knee : Standing AP, lateral, tunnel, and skyline views of the knee were obtained and reveal moderate medial and patellofemoral arthritis  XR KNEE 3 VIEWS LEFT done on 5/27/2024  IMPRESSION: 1. No acute osseous abnormalities. 2. Sig medial and patellofemoral arthritis  --- End of Report ---

## 2024-06-10 NOTE — PHYSICAL EXAM
[de-identified] : Constitutional o Appearance : well-nourished, well developed, alert, in no acute distress  Head and Face o Head :  Inspection : atraumatic, normocephalic o Face :  Inspection : no visible rash or discoloration Respiratory o Respiratory Effort: breathing unlabored  Neurologic o Mental Status Examination :  Orientation : alert and oriented X 3 Psychiatric o Mood and Affect: mood normal, affect appropriate Cardiovascular o Observation/Palpation : - no swelling Lymphatic o Additional Nodes : No palpable lymph nodes present  Lum bosacral Spine o Inspection : no visible rash or discoloration o Palpation : no paraspinal musculature tenderness o Range of Motion : extension side bending and rotation do no cause discomfort  o Muscle Strength : paraspinal muscle strength and tone within normal limits o Muscle Tone : paraspinal muscle strength and tone within normal limits o Tests: straight leg test negative bilaterally, ALEX test negative bilaterally   Right Lower Extremity o Buttock : no tenderness, swelling or deformities  o Right Hip :  Inspection/Palpation : no tenderness, swelling or deformities  Range of Motion : full and painless in all planes, no crepitance  Stability : joint stability intact  Strength : extension, flexion, adduction, abduction, internal rotation and external rotation 5/5   o Right Knee :  Inspection/Palpation : no tenderness to palpation, no swelling  Range of Motion : active flexion and extension full and painless, no crepitance  Stability : no valgus or varus instability present on provocative testing  Strength : flexion and extension 5/5  Tests and Signs : negative Anterior Drawer, negative Lachman, negative Kecia  Left Lower Extremity o Buttock : no tenderness, swelling or deformities  o Left Hip :  Inspection/Palpation : ITB tenderness, no swelling or deformities  Range of Motion : full and painless in all planes, no crepitance  Stability : joint stability intact  Strength : extension, flexion, adduction, abduction, internal rotation and external rotation 5/5  o Left Knee :  Inspection/Palpation : mild medial facet tenderness, lateral compartment tenderness,  to palpation, no medial facet tenderness, no swelling  Range of Motion : 0-135 full extension causes discomfort, no crepitance  Stability : no valgus or varus instability present on provocative testing  Strength : flexion and extension 5/5  Tests and Signs : negative Anterior Drawer, negative Lachman, negative Kecia  Gait and Station: Gait: antalgic gait, no foot drop, wearing a left knee brace, very limited core strength no significant extremity swelling or lymphedema, good proprioception and balance

## 2024-06-11 ENCOUNTER — APPOINTMENT (OUTPATIENT)
Dept: MRI IMAGING | Facility: CLINIC | Age: 79
End: 2024-06-11

## 2024-07-01 ENCOUNTER — OUTPATIENT (OUTPATIENT)
Dept: OUTPATIENT SERVICES | Facility: HOSPITAL | Age: 79
LOS: 1 days | End: 2024-07-01
Payer: MEDICARE

## 2024-07-01 ENCOUNTER — APPOINTMENT (OUTPATIENT)
Dept: ULTRASOUND IMAGING | Facility: CLINIC | Age: 79
End: 2024-07-01

## 2024-07-01 ENCOUNTER — APPOINTMENT (OUTPATIENT)
Dept: ORTHOPEDIC SURGERY | Facility: CLINIC | Age: 79
End: 2024-07-01
Payer: MEDICARE

## 2024-07-01 VITALS
HEART RATE: 86 BPM | WEIGHT: 127 LBS | SYSTOLIC BLOOD PRESSURE: 114 MMHG | BODY MASS INDEX: 25.6 KG/M2 | HEIGHT: 59 IN | DIASTOLIC BLOOD PRESSURE: 77 MMHG

## 2024-07-01 DIAGNOSIS — M79.671 PAIN IN RIGHT LEG: ICD-10-CM

## 2024-07-01 DIAGNOSIS — M79.604 PAIN IN RIGHT LEG: ICD-10-CM

## 2024-07-01 DIAGNOSIS — M79.672 PAIN IN RIGHT LEG: ICD-10-CM

## 2024-07-01 DIAGNOSIS — M17.0 BILATERAL PRIMARY OSTEOARTHRITIS OF KNEE: ICD-10-CM

## 2024-07-01 DIAGNOSIS — I70.223 ATHEROSCLEROSIS OF NATIVE ARTERIES OF EXTREMITIES WITH REST PAIN, BILATERAL LEGS: ICD-10-CM

## 2024-07-01 DIAGNOSIS — M23.92 UNSPECIFIED INTERNAL DERANGEMENT OF LEFT KNEE: ICD-10-CM

## 2024-07-01 DIAGNOSIS — M23.91 UNSPECIFIED INTERNAL DERANGEMENT OF RIGHT KNEE: ICD-10-CM

## 2024-07-01 DIAGNOSIS — M79.605 PAIN IN RIGHT LEG: ICD-10-CM

## 2024-07-01 PROCEDURE — 93970 EXTREMITY STUDY: CPT | Mod: 26

## 2024-07-01 PROCEDURE — 73564 X-RAY EXAM KNEE 4 OR MORE: CPT | Mod: RT

## 2024-07-01 PROCEDURE — 93970 EXTREMITY STUDY: CPT

## 2024-07-01 PROCEDURE — 99214 OFFICE O/P EST MOD 30 MIN: CPT

## 2024-07-01 PROCEDURE — 73590 X-RAY EXAM OF LOWER LEG: CPT | Mod: 50

## 2024-07-02 RX ORDER — MELOXICAM 15 MG/1
15 TABLET ORAL DAILY
Qty: 1 | Refills: 0 | Status: ACTIVE | COMMUNITY
Start: 2024-07-02 | End: 1900-01-01

## 2024-07-22 ENCOUNTER — APPOINTMENT (OUTPATIENT)
Dept: SPINE | Facility: CLINIC | Age: 79
End: 2024-07-22
Payer: MEDICARE

## 2024-07-22 VITALS
DIASTOLIC BLOOD PRESSURE: 68 MMHG | OXYGEN SATURATION: 97 % | WEIGHT: 127 LBS | HEIGHT: 59 IN | BODY MASS INDEX: 25.6 KG/M2 | SYSTOLIC BLOOD PRESSURE: 128 MMHG | HEART RATE: 89 BPM

## 2024-07-22 DIAGNOSIS — M48.061 SPINAL STENOSIS, LUMBAR REGION WITHOUT NEUROGENIC CLAUDICATION: ICD-10-CM

## 2024-07-22 PROCEDURE — 99213 OFFICE O/P EST LOW 20 MIN: CPT

## 2024-07-22 RX ORDER — METHOCARBAMOL 750 MG/1
TABLET, FILM COATED ORAL
Refills: 0 | Status: ACTIVE | COMMUNITY

## 2024-07-22 RX ORDER — GABAPENTIN 100 MG/1
100 CAPSULE ORAL
Refills: 0 | Status: ACTIVE | COMMUNITY

## 2024-07-22 NOTE — ASSESSMENT
[FreeTextEntry1] : Multiple level lumbar neuroforaminal stenosis Not a reasonable candidate for surgery Cont. gabapentin and start  PT

## 2024-07-22 NOTE — HISTORY OF PRESENT ILLNESS
[FreeTextEntry1] : 75 year old female with chronic lumbar radiculopathy and s/p laminectomy L 3 to L 5 in November 2020. She has progressive pain and no reaction to non surgical measures. Walking distances is not difficult. Additional stenosis is noted on MRI and she is here for radiographic image review and a discussion about surgical intervention. She does report a cervical fracture form four years ago, no surgery was performed. 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 There is mild central stenosis at multiple subaxial levels. Flexion extension lumbar x-rays suggest dynamic instability at the L4-5 level where there is a grade 2 spondylolisthesis. MRI scanning shows significant foraminal stenosis at this level.  She was last seen by our office in August 2021. A lumbar fusion L3-L5 was discussed at the time. Since then she has begun feeling like her legs are heavy and she is having "electric shocks" in the anterior regions of bilateral thighs. She denies weakness, denies issues with balance but finds ambulation difficult secondary to the pain and feeling of heaviness in bilateral lower extremities. She denies bowel/bladder incontinence. She saw her neurologist Dr. England who performed an injection in her back and started her on gabapentin one week ago. She has not done any physical therapy. New MRI show multiple level foraminal stenosis.  No sign. central stenosis. L4-5 spondylolisthesis ...all unchanged from 3 years ago.

## 2024-09-05 ENCOUNTER — EMERGENCY (EMERGENCY)
Facility: HOSPITAL | Age: 79
LOS: 1 days | Discharge: HOME | End: 2024-09-05
Attending: EMERGENCY MEDICINE | Admitting: STUDENT IN AN ORGANIZED HEALTH CARE EDUCATION/TRAINING PROGRAM
Payer: MEDICARE

## 2024-09-05 VITALS
DIASTOLIC BLOOD PRESSURE: 80 MMHG | TEMPERATURE: 98 F | HEART RATE: 64 BPM | HEIGHT: 62 IN | SYSTOLIC BLOOD PRESSURE: 140 MMHG | OXYGEN SATURATION: 100 % | WEIGHT: 149.91 LBS | RESPIRATION RATE: 16 BRPM

## 2024-09-05 LAB
ADD ON TEST-SPECIMEN IN LAB: SIGNIFICANT CHANGE UP
ALBUMIN SERPL ELPH-MCNC: 4.3 G/DL — SIGNIFICANT CHANGE UP (ref 3.3–5)
ALP SERPL-CCNC: 79 U/L — SIGNIFICANT CHANGE UP (ref 40–120)
ALT FLD-CCNC: 25 U/L — SIGNIFICANT CHANGE UP (ref 4–33)
ANION GAP SERPL CALC-SCNC: 17 MMOL/L — HIGH (ref 7–14)
APTT BLD: 34.5 SEC — SIGNIFICANT CHANGE UP (ref 24.5–35.6)
AST SERPL-CCNC: 36 U/L — HIGH (ref 4–32)
BASOPHILS # BLD AUTO: 0.06 K/UL — SIGNIFICANT CHANGE UP (ref 0–0.2)
BASOPHILS NFR BLD AUTO: 0.6 % — SIGNIFICANT CHANGE UP (ref 0–2)
BILIRUB SERPL-MCNC: 0.4 MG/DL — SIGNIFICANT CHANGE UP (ref 0.2–1.2)
BLOOD GAS VENOUS COMPREHENSIVE RESULT: SIGNIFICANT CHANGE UP
BUN SERPL-MCNC: 16 MG/DL — SIGNIFICANT CHANGE UP (ref 7–23)
CALCIUM SERPL-MCNC: 9.4 MG/DL — SIGNIFICANT CHANGE UP (ref 8.4–10.5)
CHLORIDE SERPL-SCNC: 97 MMOL/L — LOW (ref 98–107)
CO2 SERPL-SCNC: 23 MMOL/L — SIGNIFICANT CHANGE UP (ref 22–31)
CREAT SERPL-MCNC: 0.93 MG/DL — SIGNIFICANT CHANGE UP (ref 0.5–1.3)
EGFR: 63 ML/MIN/1.73M2 — SIGNIFICANT CHANGE UP
EOSINOPHIL # BLD AUTO: 0.12 K/UL — SIGNIFICANT CHANGE UP (ref 0–0.5)
EOSINOPHIL NFR BLD AUTO: 1.3 % — SIGNIFICANT CHANGE UP (ref 0–6)
GLUCOSE SERPL-MCNC: 101 MG/DL — HIGH (ref 70–99)
HCT VFR BLD CALC: 38.5 % — SIGNIFICANT CHANGE UP (ref 34.5–45)
HGB BLD-MCNC: 12.9 G/DL — SIGNIFICANT CHANGE UP (ref 11.5–15.5)
IANC: 4.52 K/UL — SIGNIFICANT CHANGE UP (ref 1.8–7.4)
IMM GRANULOCYTES NFR BLD AUTO: 0.2 % — SIGNIFICANT CHANGE UP (ref 0–0.9)
INR BLD: 1.07 RATIO — SIGNIFICANT CHANGE UP (ref 0.85–1.18)
LYMPHOCYTES # BLD AUTO: 4.07 K/UL — HIGH (ref 1–3.3)
LYMPHOCYTES # BLD AUTO: 42.9 % — SIGNIFICANT CHANGE UP (ref 13–44)
MCHC RBC-ENTMCNC: 27.3 PG — SIGNIFICANT CHANGE UP (ref 27–34)
MCHC RBC-ENTMCNC: 33.5 GM/DL — SIGNIFICANT CHANGE UP (ref 32–36)
MCV RBC AUTO: 81.4 FL — SIGNIFICANT CHANGE UP (ref 80–100)
MONOCYTES # BLD AUTO: 0.69 K/UL — SIGNIFICANT CHANGE UP (ref 0–0.9)
MONOCYTES NFR BLD AUTO: 7.3 % — SIGNIFICANT CHANGE UP (ref 2–14)
NEUTROPHILS # BLD AUTO: 4.52 K/UL — SIGNIFICANT CHANGE UP (ref 1.8–7.4)
NEUTROPHILS NFR BLD AUTO: 47.7 % — SIGNIFICANT CHANGE UP (ref 43–77)
NRBC # BLD: 0 /100 WBCS — SIGNIFICANT CHANGE UP (ref 0–0)
NRBC # FLD: 0 K/UL — SIGNIFICANT CHANGE UP (ref 0–0)
PLATELET # BLD AUTO: 203 K/UL — SIGNIFICANT CHANGE UP (ref 150–400)
POTASSIUM SERPL-MCNC: 3.4 MMOL/L — LOW (ref 3.5–5.3)
POTASSIUM SERPL-SCNC: 3.4 MMOL/L — LOW (ref 3.5–5.3)
PROT SERPL-MCNC: 7.2 G/DL — SIGNIFICANT CHANGE UP (ref 6–8.3)
PROTHROM AB SERPL-ACNC: 12 SEC — SIGNIFICANT CHANGE UP (ref 9.5–13)
RBC # BLD: 4.73 M/UL — SIGNIFICANT CHANGE UP (ref 3.8–5.2)
RBC # FLD: 14.8 % — HIGH (ref 10.3–14.5)
SODIUM SERPL-SCNC: 137 MMOL/L — SIGNIFICANT CHANGE UP (ref 135–145)
TROPONIN T, HIGH SENSITIVITY RESULT: 8 NG/L — SIGNIFICANT CHANGE UP
WBC # BLD: 9.48 K/UL — SIGNIFICANT CHANGE UP (ref 3.8–10.5)
WBC # FLD AUTO: 9.48 K/UL — SIGNIFICANT CHANGE UP (ref 3.8–10.5)

## 2024-09-05 PROCEDURE — 93010 ELECTROCARDIOGRAM REPORT: CPT

## 2024-09-05 PROCEDURE — 70496 CT ANGIOGRAPHY HEAD: CPT | Mod: 26,MC

## 2024-09-05 PROCEDURE — 99223 1ST HOSP IP/OBS HIGH 75: CPT | Mod: FS

## 2024-09-05 PROCEDURE — 0042T: CPT | Mod: MC

## 2024-09-05 PROCEDURE — 70498 CT ANGIOGRAPHY NECK: CPT | Mod: 26,MC

## 2024-09-05 PROCEDURE — 70551 MRI BRAIN STEM W/O DYE: CPT | Mod: 26,MC

## 2024-09-05 RX ORDER — TENECTEPLASE 50 MG
15 KIT INTRAVENOUS ONCE
Refills: 0 | Status: DISCONTINUED | OUTPATIENT
Start: 2024-09-05 | End: 2024-09-05

## 2024-09-05 RX ORDER — SODIUM CHLORIDE 9 MG/ML
10 INJECTION INTRAMUSCULAR; INTRAVENOUS; SUBCUTANEOUS ONCE
Refills: 0 | Status: COMPLETED | OUTPATIENT
Start: 2024-09-05 | End: 2024-09-05

## 2024-09-05 RX ORDER — ACETAMINOPHEN 325 MG/1
1000 TABLET ORAL ONCE
Refills: 0 | Status: DISCONTINUED | OUTPATIENT
Start: 2024-09-05 | End: 2024-09-09

## 2024-09-05 RX ORDER — LEVOTHYROXINE SODIUM 100 MCG
75 TABLET ORAL DAILY
Refills: 0 | Status: DISCONTINUED | OUTPATIENT
Start: 2024-09-05 | End: 2024-09-09

## 2024-09-05 RX ORDER — ZOLPIDEM TARTRATE 5 MG/1
5 TABLET, FILM COATED ORAL AT BEDTIME
Refills: 0 | Status: DISCONTINUED | OUTPATIENT
Start: 2024-09-05 | End: 2024-09-05

## 2024-09-05 RX ORDER — ASPIRIN 81 MG
81 TABLET, DELAYED RELEASE (ENTERIC COATED) ORAL ONCE
Refills: 0 | Status: COMPLETED | OUTPATIENT
Start: 2024-09-05 | End: 2024-09-05

## 2024-09-05 RX ORDER — METOCLOPRAMIDE HCL 5 MG
10 TABLET ORAL ONCE
Refills: 0 | Status: COMPLETED | OUTPATIENT
Start: 2024-09-05 | End: 2024-09-05

## 2024-09-05 RX ADMIN — Medication 10 MILLIGRAM(S): at 21:46

## 2024-09-05 RX ADMIN — Medication 300 MILLIGRAM(S): at 21:06

## 2024-09-05 RX ADMIN — Medication 81 MILLIGRAM(S): at 21:06

## 2024-09-05 NOTE — CONSULT NOTE ADULT - ASSESSMENT
79y (1945) woman with a PMHx significant for Anxiety/depression presenting with sudden onset dizziness, nausea, left facial numbness and left sided weakness. LKW 5:30pm seen by daughter. Patient reports around 6:30pm she had sudden onset of room spinning sensation, fell to the floor, denies headstrike. She had numbness in left face, left sided HA, and nausea. She took advil and a xanax. EMS was called. In the field BP was reported to SBP in 200s. Left facial droop and left sided weakness noted. NIHSS 4 for left facial droop that did not resolve with smiling, left facial numbness, left leg weakness (some effort against gravity). Patient reported never experienced these symptoms. Family reported she ate minimally today and has been under a lot of stress at home. before/ After CT patient vomited. Once in the trauma room all symptoms resolved. NIHSS improved to 0. Patient's daughter and  were at bedside with grandson on the phone who is a neurosurgeon. Case and imaging findings were discussed to push Tenecteplase but since symptoms resolved we decided not to push. Neuro exam was normal. CTH was unremarkable. CTP was normal. CTA to my eye vasculature looked patent.     LKW 5:30 9/5   NIHSS 4-->0  mRS 0  ABCD2  6    Impression: resolved vertigo, N/V, left facial droop and numbness, left leg weakness 2/2 TIA vs acute ischemic stroke vs hypertensive emergency vs vertiginous migraine with atypical symptoms.       Plan  [] follow up CT reports  []  Aspirin 81 and Plavix 300mg loading dose then ASA81/[id number] for 3 weeks followed by ASA 81 alone per CHANCE trial  []  Atorvastatin 80, titrate to LDL<70  []  MRI brain w/o contrast to look at the extent and distribution of the stroke   [] TTE with bubble study and telemetry to look for a cardiac source of embolism.   []  DVT prophylaxis   []  Telemonitoring;  Neurochecks per unit protocol  []  Permissive HTN up to 220/120 mmHg for first 24 hours after the symptom onset followed by gradual normotension.   []  Please send HbA1C and Lipid Panel  []  PT/OT evaluation  []  NPO until clears Dysphagia screen, otherwise Swallow evaluation  []  Stroke education provided      Case to be discussed with Stroke Fellow & Attending  79y (1945) woman with a PMHx significant for Anxiety/depression presenting with sudden onset dizziness, nausea, left facial numbness and left sided weakness. LKW 5:30pm seen by daughter. Patient reports around 6:30pm she had sudden onset of room spinning sensation, fell to the floor, denies headstrike. She had numbness in left face, left sided HA, and nausea. She took advil and a xanax. EMS was called. In the field BP was reported to SBP in 200s. Left facial droop and left sided weakness noted. NIHSS 4 for left facial droop that did not resolve with smiling, left facial numbness, left leg weakness (some effort against gravity). Patient reported never experienced these symptoms. Family reported she ate minimally today and has been under a lot of stress at home. before/ After CT patient vomited. Once in the trauma room all symptoms resolved. NIHSS improved to 0. Patient's daughter and  were at bedside with grandson on the phone who is a neurosurgeon. Case and imaging findings were discussed to push Tenecteplase but since symptoms resolved we decided not to push. Neuro exam was normal. CTH was unremarkable. CTP was normal. CTA to my eye vasculature looked patent.     LKW 5:30 9/5   NIHSS 4-->0  mRS 0  ABCD2  6    Impression: resolved vertigo, N/V, left facial droop and numbness, left leg weakness 2/2 TIA vs acute ischemic stroke vs vertiginous migraine with atypical symptoms       Plan  [] follow up CT reports  []  Aspirin 81 and Plavix 300mg loading dose then ASA81/[id number] for 3 weeks followed by ASA 81 alone per CHANCE trial  []  Atorvastatin 80, titrate to LDL<70  []  MRI brain w/o contrast to look at the extent and distribution of the stroke   [] TTE   []  DVT prophylaxis   []  Telemonitoring;  Neurochecks per unit protocol  []  Permissive HTN up to 220/120 mmHg for first 24 hours after the symptom onset followed by gradual normotension.   []  Please send HbA1C and Lipid Panel  []  PT/OT evaluation  []  NPO until clears Dysphagia screen, otherwise Swallow evaluation  []  Stroke education provided    Case discussed with Stroke Attending, Dr. Gardner 79y (1945) woman with a PMHx significant for Anxiety/depression presenting with sudden onset dizziness, nausea, left facial numbness and left sided weakness. Around 5pm she took 4 ibuprofen for shoulder pain and knee pain which is not abnormal for her. She also took 5mg of xanax for anxiety.  Patient reported never experienced these symptoms. Family reported she ate minimally today and has been under a lot of stress at home. LKW 5:30pm seen by daughter. Patient reports around 6:30pm she had sudden onset of room spinning sensation, fell to the floor, denies head strike. She had numbness in left face and nausea. EMS was called. In the field BP was reported to SBP in 200s. Left facial droop and left sided weakness noted. NIHSS 4 for left facial droop that did not resolve with smiling, left facial numbness, left leg weakness (some effort against gravity). After CT patient vomited. Once in the trauma room all symptoms resolved. NIHSS improved to 0. Patient's daughter and  were at bedside with grandson on the phone who is a neurosurgeon. Case and imaging findings were discussed to push Tenecteplase but since symptoms resolved we decided not to push. Neuro exam was normal. CTH was unremarkable. CTP was normal. CTA to my eye vasculature looked patent.     LK 5:30 9/5   NIHSS 4-->0  mRS 0  ABCD2  6    Impression: resolved vertigo, N/V, left facial droop and numbness, left leg weakness 2/2 TIA vs acute ischemic stroke vs vertiginous migraine with atypical symptoms       Plan  [] follow up CT reports  []  Aspirin 81 and Plavix 300mg loading dose then ASA81/Kygujc24 for 3 weeks followed by ASA 81 alone per CHANCE trial  []  Atorvastatin 80, titrate to LDL<70  []  MRI brain w/o contrast to look at the extent and distribution of the stroke   [] TTE   []  DVT prophylaxis   []  Telemonitoring;  Neurochecks per unit protocol  []  Permissive HTN up to 220/120 mmHg for first 24 hours after the symptom onset followed by gradual normotension.   []  Please send HbA1C and Lipid Panel  []  PT/OT evaluation  []  NPO until clears Dysphagia screen, otherwise Swallow evaluation  []  Stroke education provided    Case discussed with Stroke Attending, Dr. Gardner 79y (1945) woman with a PMHx significant for Anxiety/depression presenting with sudden onset dizziness, nausea, left facial numbness and left sided weakness. Around 5pm she took 4 ibuprofen for shoulder pain and knee pain which is not abnormal for her. She also took 5mg of xanax for anxiety.  Patient reported never experienced these symptoms. Family reported she ate minimally today and has been under a lot of stress at home. LKW 5:30pm seen by daughter. Patient reports around 6:30pm she had sudden onset of room spinning sensation, fell to the floor, denies head strike. She had numbness in left face and nausea. EMS was called. In the field BP was reported to SBP in 200s. Left facial droop and left sided weakness noted. NIHSS 4 for left facial droop that did not resolve with smiling, left facial numbness, left leg weakness (some effort against gravity). After CT patient vomited. Once in the trauma room all symptoms resolved. NIHSS improved to 0. Patient's daughter and  were at bedside with grandson on the phone who is a neurosurgeon. Case and imaging findings were discussed to push Tenecteplase but since symptoms resolved we decided not to push. Neuro exam was normal. CTH was unremarkable. CTP was normal. CTA to my eye vasculature looked patent. Due rapid resolution of symptoms and discussion with family did not give tenecteplase. Not MT candidate due to no LVO.     LK 5:30 9/5   NIHSS 4-->0  mRS 0  ABCD2  6    Impression: resolved vertigo, N/V, left facial droop and numbness, left leg weakness 2/2 TIA vs acute ischemic stroke vs vertiginous migraine with atypical symptoms       Plan  [x] follow up CT reports  []  Aspirin 81 and Plavix 300mg loading dose then ASA81/[id number] for 3 weeks followed by ASA 81 alone per CHANCE trial  []  Atorvastatin 80, titrate to LDL<70  []  MRI brain w/o contrast to look at the extent and distribution of the stroke   [] TTE   []  DVT prophylaxis   []  Telemonitoring;  Neurochecks per unit protocol  []  Permissive HTN up to 220/120 mmHg for first 24 hours after the symptom onset followed by gradual normotension.   []  Please send HbA1C and Lipid Panel  []  PT/OT evaluation  []  NPO until clears Dysphagia screen, otherwise Swallow evaluation  []  Stroke education provided    Case discussed with Stroke Attending, Dr. Gardner

## 2024-09-05 NOTE — ED ADULT NURSE NOTE - NSFALLRISKINTERV_ED_ALL_ED

## 2024-09-05 NOTE — ED ADULT NURSE NOTE - OBJECTIVE STATEMENT
joelle rn. pt A&Ox4, ambulatory at baseline. history of anxiety and depression. Pt arrives as code stroke notification. per family LKW was 530pm. pt c/o prior to arrival of L side facial droop and left side weakness. Also endorsing dizziness with nausea w/o vomiting. denies any recent illnesses/travel/falls. denies chest pain, SOB, numbness/tingling to hands/feet. no facial droop noted upon assessment post CT scan, droop to L lower extremity compared to baseline per patient. arrive with 22g to L hand, placed 20g IV to R AC, pt had mastectomy >10 years ago, MD aware and okay with IV line. respirations even unlabored, abdomen soft, pedal pulses 2+ bilaterally safety maintained neuro at bedside awaiting furthers recs

## 2024-09-05 NOTE — ED ADULT NURSE NOTE - PAIN: PRESENCE, MLM
denies pain/discomfort (Rating = 0) Lives alone, runs financial business still works. Walks without assistance, no help at home, independent with all ADLs and iADLS.    No tobacco, alc, drugs.

## 2024-09-05 NOTE — ED PROVIDER NOTE - CLINICAL SUMMARY MEDICAL DECISION MAKING FREE TEXT BOX
79-year-old female with past medical history of anxiety, depression, presenting to the ED with acute onset of nausea, headache, dizziness, left facial droop. Vitals notable for hypertension to SBP 200s. Neuro exam notable for  L facial droop. L lower extremity weakness. L facial/lower extremity sensory deficit. NIHSS 4. Code stroke called.

## 2024-09-05 NOTE — ED PROVIDER NOTE - PROGRESS NOTE DETAILS
REUBEN:  Patient's NIH now 0 with resolution of deficits.  Reports resolution of dizziness.  Will defer Tenecteplase administration given rapid improvement in stroke symptoms.  Family agrees with plan. MD Anna (PGY-3) neuro rec CDU.

## 2024-09-05 NOTE — ED PROVIDER NOTE - ATTENDING CONTRIBUTION TO CARE
Brief HPI:  79-year-old female past medical history of hypertension, hypothyroidism, chronic left knee pain presents a stroke code from field.  Last known normal time 1730, 1 patient experience left-sided facial droop, left-sided leg weakness.  Upon arrival, patient hypertensive.  She is can mental status, left-sided facial droop and left lower extremity hip extension weakness on testing.  Patient was CT scan showing no acute infarct or hemorrhage.  Collaboration with neurology, recommendation was to give tenecteplase for NIH score of 4 however patient with rapidly improving and ultimate resolution of symptoms.  Plan to follow neurology recs, labs.  Permissive hypertension per neurology.  Likely admission for CDU for TIA workup.

## 2024-09-05 NOTE — ED CDU PROVIDER INITIAL DAY NOTE - OBJECTIVE STATEMENT
Initial ED provider note: "79-year-old female with past medical history of anxiety, depression, presenting to the ED with acute onset of nausea, headache, dizziness, left facial droop.  Dizziness is described as vertigo.  Patient last known well 5:30 PM.  Patient took Xanax and ibuprofen with minimal relief.  Denies chest pain, SOB."    CDU note: Agree with above. Pt presented to the ED with acute onset of left sided facial droop and left sided weakness. Upon arrival, symptoms resolved. Code stroke was activated, deemed not a tPA candidate. Initial ED provider note: "79-year-old female with past medical history of anxiety, depression, presenting to the ED with acute onset of nausea, headache, dizziness, left facial droop.  Dizziness is described as vertigo.  Patient last known well 5:30 PM.  Patient took Xanax and ibuprofen with minimal relief.  Denies chest pain, SOB."    CDU note: Agree with above. Pt presented to the ED with acute onset of left sided facial droop and left sided weakness. Upon arrival, symptoms resolved. Code stroke was activated, deemed not a tPA candidate. Initially NIHSS initially on arrival was 4, but then after CT, her score improved to 0, and so no tPA was administered. Labs reviewed, no gross metabolic or electrolytes disturbance, CT brain shows no acute intracranial findings. Upon my evaluation, she reports feeling better, her neuro exam is non-focal. Neuro recs appreciated. CDU plan for MRI brain, TTE, and neuro checks.

## 2024-09-05 NOTE — ED ADULT NURSE REASSESSMENT NOTE - NS ED NURSE REASSESS COMMENT FT1
upon reassessment of patient, no facial droop noted, L lower extremity weakness resolved. denies blurry vision. pt does state feels nauseous and dizziness. MD aware and at bedside for reassessment. safety maintained. report given to Float STEFF Silvestre and Mobile ICU Float STEFF Del Toro

## 2024-09-05 NOTE — CONSULT NOTE ADULT - CRITICAL CARE ATTENDING COMMENT
79F with Anxiety/depression p/w vertigo, L hemiparesis. SBP noted to be in 200s by EMS. stroke code called on ED arrival and pt emergently assessed.   Initial NIHSS 4 - > 0  not tnk candidate due to resolution of symptoms  no lVO on CTA  Imaging negative for acute pathology, including MRI  Would continue DAPT x 3 weeks with aspirin monotherapy afterwards  TTE reviewed  Outpatietn stroke followup    Sapna Ruiz DO  Vascular Neurology  Office [phone]

## 2024-09-05 NOTE — ED PROVIDER NOTE - PHYSICAL EXAMINATION
Const: not in acute distress  Eyes: no conjunctival injection  HEENT: Head NCAT, Moist MM.  Neck: Trachea midline.   CVS: +S1/S2, No murmurs or gallops  RESP: Unlabored respiratory effort. Clear to auscultation bilaterally.  GI: Nontender/Nondistended, No CVA tenderness b/l.   Skin: Intact.   Neuro: L facial droop. L lower extremity weakness. L facial/lower extremity sensory deficit.  Psych: Awake, Alert, & Cooperative

## 2024-09-05 NOTE — ED ADULT TRIAGE NOTE - CHIEF COMPLAINT QUOTE
Pt arrives as code stroke, C/o L sided facial droop and left sided weakness. Last known well  5:30 p.m. Pt denies any chest pain, sob, dizziness, recent falls or fevers. Arrived w/ 22g IV to L hand. PMH anxiety. Code stroke activated upon EMS notification. Pt brought directly to CT.

## 2024-09-05 NOTE — ED CDU PROVIDER INITIAL DAY NOTE - ATTENDING APP SHARED VISIT CONTRIBUTION OF CARE
I have evaluated the patient and agree with the documentation and assessment as made by the PA. We have discussed plan of care and work up for the patient.    Exam:    GEN:  Non-toxic appearing, non-distressed, speaking full sentences, non-diaphoretic, AAOx3  HEENT:  NCAT, neck supple, EOMI, PERRLA, sclera anicteric, no conjunctival pallor or injection, no stridor, normal voice, no tonsillar exudate, uvula midline  CV:  regular rhythm and rate, s1/s2 audible, no murmurs, rubs or gallops, peripheral pulses 2+ and symmetric  PULM:  non-labored respirations, lungs clear to auscultation bilaterally, no wheezes, crackles or rales  ABD:  non distended, non-tender, no rebound, no guarding, negative Domínguez's sign, bowel sounds normal, no cvat  MSK:  no gross deformity, non-tender extremities and joints, range of motion grossly normal appearing, no extremity edema, extremities warm and well perfused   NEURO:  AAOx3, CN II-XII intact, motor 5/5 in upper and lower extremities bilaterally, sensation grossly intact in extremities and trunk, finger to nose testing wnl, no nystagmus, negative Romberg, no pronator drift, no gait deficit  SKIN:  warm, dry, no rash or vesicles

## 2024-09-05 NOTE — ED PROVIDER NOTE - OBJECTIVE STATEMENT
79-year-old female with past medical history of anxiety, depression, presenting to the ED with acute onset of nausea, headache, dizziness, left facial droop.  Dizziness is described as vertigo.  Patient last known well 5:30 PM.  Patient took Xanax and ibuprofen with minimal relief.  Denies chest pain, SOB.

## 2024-09-05 NOTE — ED CDU PROVIDER INITIAL DAY NOTE - NSICDXFAMILYHX_GEN_ALL_CORE_FT
Telephone call to norman.  Message left for norman to call [phone] to let us know the status of taiwo in regards to hospice, if she is still on hospice, please clarify which hospice service so the care can be coordinated between hospice care provider and this clinic.   FAMILY HISTORY:  No pertinent family history in first degree relatives

## 2024-09-05 NOTE — CONSULT NOTE ADULT - SUBJECTIVE AND OBJECTIVE BOX
Neurology - Consult Note    -  Spectra: [phone] ([facility]), [phone] ([facility])  -    HPI: Patient MELA NUÑEZ is a 79y (1945) wo/man with a PMHx significant for Anxiety/depression presenting with sudden onset dizziness, nausea, left facial numbness and left sided weakness. LKW 5:30pm seen by daughter.     LKW 5:30 9/5   NIHSS 4  mRS 0      Review of Systems:  INCOMPLETE   CONSTITUTIONAL: No fevers or chills  EYES AND ENT: No visual changes or no throat pain   NECK: No pain or stiffness  RESPIRATORY: No hemoptysis or shortness of breath  CARDIOVASCULAR: No chest pain or palpitations  GASTROINTESTINAL: No melena or hematochezia  GENITOURINARY: No dysuria or hematuria  NEUROLOGICAL: +As stated in HPI above  SKIN: No itching, burning, rashes, or lesions   All other review of systems is negative unless indicated above.    Allergies:  Ultram (Other)  Percocet 10/325 (Other)  flu shot (Other)      PMHx/PSHx/Family Hx: As above, otherwise see below   Breast Cancer    Hyperparathyroidism    Nodular Goiter    Hypothyroidism, unspecified type    Chronic midline low back pain with right-sided sciatica    Odontoid fracture, sequela        Social Hx:  No current use of tobacco, alcohol, or illicit drugs  Lives with ***    Medications:  MEDICATIONS  (STANDING):    MEDICATIONS  (PRN):      Vitals:  T(C): 36.7 (09-05-24 @ 19:52), Max: 36.7 (09-05-24 @ 19:52)  HR: 64 (09-05-24 @ 19:52) (64 - 64)  BP: 140/80 (09-05-24 @ 19:52) (140/80 - 140/80)  RR: 16 (09-05-24 @ 19:52) (16 - 16)  SpO2: 100% (09-05-24 @ 19:52) (100% - 100%)    Physical Examination: INCOMPLETE  General - NAD  Cardiovascular - Peripheral pulses palpable, no edema  Eyes - Fundoscopy with flat, sharp optic discs and no hemorrhage or exudates; Fundoscopy not well visualized; Fundoscopy not performed due to safety precautions in the setting of the COVID-19 pandemic    Neurologic Exam:  Mental status - Awake, Alert, Oriented to person, place, and time. Speech fluent, repetition and naming intact. Follows simple and complex commands. Attention/concentration, recent and remote memory (including registration and recall), and fund of knowledge intact    Cranial nerves - PERRLA, VFF, EOMI, face sensation (V1-V3) intact b/l, facial strength intact without asymmetry b/l, hearing intact b/l, palate with symmetric elevation, trapezius OR sternocleidomastiod 5/5 strength b/l, tongue midline on protrusion with full lateral movement    Motor - Normal bulk and tone throughout. No pronator drift.  Strength testing            Deltoid      Biceps      Triceps     Wrist Extension    Wrist Flexion     Interossei         R            5                 5               5                     5                              5                        5                 5  L             5                 5               5                     5                              5                        5                 5              Hip Flexion    Hip Extension    Knee Flexion    Knee Extension    Dorsiflexion    Plantar Flexion  R              5                           5                       5                           5                            5                          5  L              5                           5                        5                           5                            5                          5    Sensation - Light touch/temperature OR pain/vibration intact throughout    DTR's -             Biceps      Triceps     Brachioradialis      Patellar    Ankle    Toes/plantar response  R             2+             2+                  2+                       2+            2+                 Down  L              2+             2+                 2+                        2+           2+                 Down    Coordination - Finger to Nose intact b/l. No tremors appreciated    Gait and station - Normal casual gait. Romberg (-)    Labs:          CAPILLARY BLOOD GLUCOSE      POCT Blood Glucose.: 100 mg/dL (05 Sep 2024 19:55)          CSF:                  Radiology:     Neurology - Consult Note    -  Spectra: [phone] ([facility]), [phone] ([facility])  -    HPI: Patient MELA NUÑEZ is a 79y (1945) woman with a PMHx significant for Anxiety/depression presenting with sudden onset dizziness, nausea, left facial numbness and left sided weakness. LKW 5:30pm seen by daughter. Patient reports around 6:30pm she had sudden onset of room spinning sensation, fell to the floor, denies headstrike. She had numbness in left face and nausea. EMS was called. In the field BP was reported to SBP in 200s. Left facial droop and left sided weakness noted. NIHSS 4 for left facial droop that did not resolve with smiling, left facial numbness, left leg weakness (some effort against gravity). After CT patient vomited. Once in the trauma room all symptoms resolved. NIHSS improved to 0. Patient's daughter and  were at bedside with grandson on the phone who is a neurosurgeon. Case and imaging findings were discussed to push Tenecteplase but since symptoms resolved we decided not to push.     LKW 5:30 9/5   NIHSS 4-->0  mRS 0  ABCD2  6    Review of Systems: refer to HPI     Allergies:  Ultram (Other)  Percocet 10/325 (Other)  flu shot (Other)    PMHx/PSHx/Family Hx: As above, otherwise see below   Breast Cancer  Hyperparathyroidism  Nodular Goiter  Hypothyroidism, unspecified type  Chronic midline low back pain with right-sided sciatica  Odontoid fracture, sequela    Social Hx:  No current use of tobacco, alcohol, or illicit drugs  Lives with home by herself     Medications:  MEDICATIONS  (STANDING):  MEDICATIONS  (PRN):    Vitals:  T(C): 36.7 (09-05-24 @ 19:52), Max: 36.7 (09-05-24 @ 19:52)  HR: 64 (09-05-24 @ 19:52) (64 - 64)  BP: 140/80 (09-05-24 @ 19:52) (140/80 - 140/80)  RR: 16 (09-05-24 @ 19:52) (16 - 16)  SpO2: 100% (09-05-24 @ 19:52) (100% - 100%)    Physical Examination:   General - NAD    Neurologic Exam:  Mental status - Awake, Alert, Oriented to person, place, and time. Speech fluent, repetition and naming intact. Follows simple  commands. Attention/concentration, recent and remote memory (including registration and recall), and fund of knowledge intact    Cranial nerves - PERRLA, VFF, EOMI, face sensation (V1-V3) intact b/l, facial strength intact without asymmetry b/l, hearing intact b/l, palate with symmetric elevation, trapezius  5/5 strength b/l, tongue midline on protrusion with full lateral movement    Motor - Normal bulk and tone throughout. No pronator drift.  Strength testing            Deltoid      Biceps      Triceps      R            5                 5               5                     5                         L             5                 5               5                     5                                     Hip Flexion       Knee Flexion    Knee Extension    Dorsiflexion    Plantar Flexion  R              5                           5                       5                           5                            5                            L              5                           5                        5                           5                            5                              Sensation - Light touch intact throughout    DTR's -             Biceps      Triceps     Brachioradialis      Patellar    Ankle    Toes/plantar response  R             2+             2+                  2+                       2+            2+                 Down  L              2+             2+                 2+                        2+           2+                 Down    Coordination - Finger to Nose intact b/l. No tremors appreciated. normal heel to shin b/l     Gait and station - deferred due to severe dizziness    Labs    POCT Blood Glucose.: 100 mg/dL (05 Sep 2024 19:55)          CSF:                  Radiology:     Neurology - Consult Note    -  Spectra: [phone] ([facility]), [phone] ([facility])  -    HPI: Patient MELA NUÑEZ is a 79y (1945) woman with a PMHx significant for Anxiety/depression presenting with sudden onset dizziness, nausea, left facial numbness and left sided weakness. Around 5pm she took 4 ibuprofen for shoulder pain and knee pain which is not abnormal for her. She also took 5mg of xanax for anxiety.  Patient reported never experienced these symptoms. Family reported she ate minimally today and has been under a lot of stress at home. LKW 5:30pm seen by daughter. Patient reports around 6:30pm she had sudden onset of room spinning sensation, fell to the floor, denies head strike. She had numbness in left face and nausea. EMS was called. In the field BP was reported to SBP in 200s. Left facial droop and left sided weakness noted. NIHSS 4 for left facial droop that did not resolve with smiling, left facial numbness, left leg weakness (some effort against gravity). After CT patient vomited. Once in the trauma room all symptoms resolved. NIHSS improved to 0. Patient's daughter and  were at bedside with grandson on the phone who is a neurosurgeon. Case and imaging findings were discussed to push Tenecteplase but since symptoms resolved we decided not to push.     LKW 5:30 9/5   NIHSS 4-->0  mRS 0  ABCD2  6    Review of Systems: refer to HPI     Allergies:  Ultram (Other)  Percocet 10/325 (Other)  flu shot (Other)    PMHx/PSHx/Family Hx: As above, otherwise see below   Breast Cancer  Hyperparathyroidism  Nodular Goiter  Hypothyroidism, unspecified type  Chronic midline low back pain with right-sided sciatica  Odontoid fracture, sequela    Social Hx:  No current use of tobacco, alcohol, or illicit drugs  Lives with home by herself     Medications:  MEDICATIONS  (STANDING):  MEDICATIONS  (PRN):    Vitals:  T(C): 36.7 (09-05-24 @ 19:52), Max: 36.7 (09-05-24 @ 19:52)  HR: 64 (09-05-24 @ 19:52) (64 - 64)  BP: 140/80 (09-05-24 @ 19:52) (140/80 - 140/80)  RR: 16 (09-05-24 @ 19:52) (16 - 16)  SpO2: 100% (09-05-24 @ 19:52) (100% - 100%)    Physical Examination:   General - NAD    Neurologic Exam:  Mental status - Awake, Alert, Oriented to person, place, and time. Speech fluent, repetition and naming intact. Follows simple  commands. Attention/concentration, recent and remote memory (including registration and recall), and fund of knowledge intact    Cranial nerves - PERRLA, VFF, EOMI, face sensation (V1-V3) intact b/l, facial strength intact without asymmetry b/l, hearing intact b/l, palate with symmetric elevation, trapezius  5/5 strength b/l, tongue midline on protrusion with full lateral movement    Motor - Normal bulk and tone throughout. No pronator drift.  Strength testing            Deltoid      Biceps      Triceps      R            5                 5               5                     5                         L             5                 5               5                     5                                     Hip Flexion       Knee Flexion    Knee Extension    Dorsiflexion    Plantar Flexion  R              5                           5                       5                           5                            5                            L              5                           5                        5                           5                            5                              Sensation - Light touch intact throughout    DTR's -             Biceps      Triceps     Brachioradialis      Patellar    Ankle    Toes/plantar response  R             2+             2+                  2+                       2+            2+                 Down  L              2+             2+                 2+                        2+           2+                 Down    Coordination - Finger to Nose intact b/l. No tremors appreciated. normal heel to shin b/l     Gait and station - deferred due to severe dizziness    Labs    POCT Blood Glucose.: 100 mg/dL (05 Sep 2024 19:55)    Radiology:  CTH  1. No intracranial hemorrhage is appreciated.  2. No intracranial mass lesion is appreciated.    CTA H/N   1. RIGHT CAROTID SYSTEM: Atherosclerotic plaque carotid bulb without hemodynamically significant stenosis.  2. LEFT CAROTID SYSTEM: Atherosclerotic plaque carotid bulb without hemodynamically significant stenosis.  3. VERTEBRAL CIRCULATION: Patent.  4. ANTERIOR INTRACRANIAL CIRCULATION: Intracranial atherosclerosis cavernous and clinoid segments of the internal carotid arteries, mild-to-moderate.  5. POSTERIOR INTRACRANIAL CIRCULATION: Unremarkable.  6. No large vessel occlusion  -Asymmetric cavernous sinus enhancement is nonspecific    CTP: No acute infarction of the brain is convincingly demonstrated.

## 2024-09-06 ENCOUNTER — RESULT REVIEW (OUTPATIENT)
Age: 79
End: 2024-09-06

## 2024-09-06 ENCOUNTER — TRANSCRIPTION ENCOUNTER (OUTPATIENT)
Age: 79
End: 2024-09-06

## 2024-09-06 VITALS
SYSTOLIC BLOOD PRESSURE: 103 MMHG | HEART RATE: 73 BPM | DIASTOLIC BLOOD PRESSURE: 64 MMHG | OXYGEN SATURATION: 96 % | RESPIRATION RATE: 16 BRPM | TEMPERATURE: 98 F

## 2024-09-06 LAB
CHOLEST SERPL-MCNC: 209 MG/DL — HIGH
HDLC SERPL-MCNC: 84 MG/DL — SIGNIFICANT CHANGE UP
LIPID PNL WITH DIRECT LDL SERPL: 117 MG/DL — HIGH
NON HDL CHOLESTEROL: 125 MG/DL — SIGNIFICANT CHANGE UP
TRIGL SERPL-MCNC: 41 MG/DL — SIGNIFICANT CHANGE UP

## 2024-09-06 PROCEDURE — 93306 TTE W/DOPPLER COMPLETE: CPT | Mod: 26

## 2024-09-06 PROCEDURE — 99239 HOSP IP/OBS DSCHRG MGMT >30: CPT

## 2024-09-06 RX ADMIN — Medication 75 MICROGRAM(S): at 06:05

## 2024-09-06 RX ADMIN — Medication 75 MILLIGRAM(S): at 11:36

## 2024-09-06 NOTE — PROGRESS NOTE ADULT - SUBJECTIVE AND OBJECTIVE BOX
SUBJECTIVE / OVERNIGHT EVENTS:pt seen and examined pts daughter next to pts bedisde  09-06-24    MEDICATIONS  (STANDING):  acetaminophen   IVPB .. 1000 milliGRAM(s) IV Intermittent once  atorvastatin 80 milliGRAM(s) Oral at bedtime  clopidogrel Tablet 75 milliGRAM(s) Oral daily  levothyroxine 75 MICROGram(s) Oral daily    MEDICATIONS  (PRN):  zolpidem 5 milliGRAM(s) Oral at bedtime PRN Insomnia    T(C): 36.4 (09-06-24 @ 09:43), Max: 36.5 (09-06-24 @ 05:54)  HR: 73 (09-06-24 @ 09:43) (64 - 73)  BP: 103/64 (09-06-24 @ 09:43) (103/64 - 147/76)  RR: 16 (09-06-24 @ 09:43) (15 - 17)  SpO2: 96% (09-06-24 @ 09:43) (95% - 97%)    CAPILLARY BLOOD GLUCOSE        I&O's Summary      Constitutional: No fever, fatigue  Skin: No rash.  Eyes: No recent vision problems or eye pain.  ENT: No congestion, ear pain, or sore throat.  Cardiovascular: No chest pain or palpation.  Respiratory: No cough, shortness of breath, congestion, or wheezing.  Gastrointestinal: No abdominal pain, nausea, vomiting, or diarrhea.  Genitourinary: No dysuria.  Musculoskeletal: No joint swelling.  Neurologic: No headache.    PHYSICAL EXAM:  GENERAL: NAD  EYES: EOMI, PERRLA  NECK: Supple, No JVD  CHEST/LUNG: cta patricia  HEART:  S1 , S2 +  ABDOMEN: soft , bs+  EXTREMITIES:  no edema  NEUROLOGY:alert awake oriented       LABS:                        12.9   9.48  )-----------( 203      ( 05 Sep 2024 20:05 )             38.5     09-05    137  |  97<L>  |  16  ----------------------------<  101<H>  3.4<L>   |  23  |  0.93    Ca    9.4      05 Sep 2024 20:05    TPro  7.2  /  Alb  4.3  /  TBili  0.4  /  DBili  x   /  AST  36<H>  /  ALT  25  /  AlkPhos  79  09-05    PT/INR - ( 05 Sep 2024 20:05 )   PT: 12.0 sec;   INR: 1.07 ratio         PTT - ( 05 Sep 2024 20:05 )  PTT:34.5 sec      Urinalysis Basic - ( 05 Sep 2024 20:05 )    Color: x / Appearance: x / SG: x / pH: x  Gluc: 101 mg/dL / Ketone: x  / Bili: x / Urobili: x   Blood: x / Protein: x / Nitrite: x   Leuk Esterase: x / RBC: x / WBC x   Sq Epi: x / Non Sq Epi: x / Bacteria: x        RADIOLOGY & ADDITIONAL TESTS:    Imaging Personally Reviewed:    Consultant(s) Notes Reviewed:      Care Discussed with Consultants/Other Providers:

## 2024-09-06 NOTE — ED CDU PROVIDER SUBSEQUENT DAY NOTE - PROGRESS NOTE DETAILS
Pt signed out to me awaiting MRI results and neuro evaluation. MRI does not show evidence of acute infarct, does show old fracture with malunion unchanged from prior imaging. Pt seen by neuro attending, recommending discharge with aspirin 81mg daily and plavix 75mg once daily for 3 weeks. Pt reassessed and reports she is feeling well, asymptomatic, ambulating independently in CDU. At time of discharge pt is well appearing, vitals stable. Pt to f/u with her PMD and neuro outpt. Discharge discussed with ED attending.

## 2024-09-06 NOTE — DISCHARGE NOTE NURSING/CASE MANAGEMENT/SOCIAL WORK - PATIENT PORTAL LINK FT
You can access the FollowMyHealth Patient Portal offered by [facility] by registering at the following website: [email or web address]. By joining [facility]’s FollowMyHealth portal, you will also be able to view your health information using other applications (apps) compatible with our system.

## 2024-09-06 NOTE — PHYSICAL THERAPY INITIAL EVALUATION ADULT - ACTIVE RANGE OF MOTION EXAMINATION, REHAB EVAL
patricia. upper extremity Active ROM was WNL (within normal limits)/bilateral lower extremity Active ROM was WNL (within normal limits)

## 2024-09-06 NOTE — OCCUPATIONAL THERAPY INITIAL EVALUATION ADULT - ADDITIONAL COMMENTS
Pt lives alone in a private home with 5-6 steps to enter and a full flight inside going upstairs to pt's bedroom and going downstairs to basement to pt's laundry room. Pt's bathroom is a walk-in shower with a shower chair. Pt reports being independent with all ADLs and ambulated without a device prior to admission. Pt denies any recent falls.

## 2024-09-06 NOTE — OCCUPATIONAL THERAPY INITIAL EVALUATION ADULT - NSOTDISCHREC_GEN_A_CORE
Recommend supervision/assist with functional activities which pt reports family can provide./No skilled OT needs

## 2024-09-06 NOTE — ED CDU PROVIDER DISPOSITION NOTE - NSFOLLOWUPINSTRUCTIONS_ED_ALL_ED_FT
Follow up with your primary care doctor within 1 week  Follow up with neurology within 1 week  Return to the ER with any worsening or concerning symptoms, weakness, numbness, difficulty speaking or swallowing, difficulty walking or any other concerns. Follow up with your primary care doctor within 1 week  Follow up with neurology within 1 week, office information listed above for , please call to make an appointment  Take Plavix 75mg once a day for 3 weeks  Take Aspirin 81mg daily  Continue taking other medications as previously prescribed to you  Return to the ER with any worsening or concerning symptoms, weakness, numbness, difficulty speaking or swallowing, difficulty walking or any other concerns.

## 2024-09-06 NOTE — OCCUPATIONAL THERAPY INITIAL EVALUATION ADULT - DIAGNOSIS, OT EVAL
Pt with impaired balance and generalized weakness impacting ability to complete ADLs, IADLs, functional mobility/transfers.

## 2024-09-06 NOTE — ED CDU PROVIDER SUBSEQUENT DAY NOTE - ATTENDING APP SHARED VISIT CONTRIBUTION OF CARE
Right eye irrigated,  
Attending MD Hartman: I personally made/approved the management plan and take responsibility for the patient management.    Patient is a 79-year-old female with past medical history of anxiety, depression who presented to the ED with acute onset of nausea, headache, dizziness, left facial droop, last known well 5:30 PM.  Patient took Xanax and ibuprofen with minimal relief.  In ED,  NIHSS initially on arrival was 4, but then after CT, her score improved to 0 and no TNK was administered. Labs-nonactionable. CT also non-actionable. Asymptomatic at this time. Plan for CDU for neuro checks, pt/ot, mri and tte.     CONSTITUTIONAL: No fevers, no chills, no lightheadedness, no dizziness  EYES: no visual changes, no eye pain  EARS: no ear drainage, no ear pain, no change in hearing  NOSE: no nasal congestion  MOUTH/THROAT: no sore throat  CV: No chest pain, no palpitations  RESP: No SOB, no cough  GI: No n/v/d, no abd pain  : no dysuria, no hematuria, no flank pain  MSK: no back pain, no extremity pain  SKIN: no rashes  NEURO: no headache, no focal weakness, no decreased sensation/parasthesias   PSYCHIATRIC: no known mental health issues    General: Alert and Orientated x 3. No apparent distress.  Head: Normocephalic and atraumatic.  Eyes: PERRLA with EOMI.  Neck: Supple. Trachea midline.   Cardiac: Normal S1 and S2 w/ RRR. No murmurs appreciated. No JVD appreciated.  Pulmonary: CTA bilaterally. No increased WOB. No wheezes or crackles.  Abdominal: Soft, non-tender. (+) bowel sounds appreciated in all 4 quadrants. No hepatosplenomegaly.   Neurologic: cn2-12 grossly intact, no dysmetria. gait normal.   Musculoskeletal: Strength appropriate in all 4 extremities for age with no limited ROM.  Skin: Color appropriate for race. Intact, warm, and well-perfused.  Psychiatric: Appropriate mood and affect. No apparent risk to self or others.     MDM: 79-year-old female with past medical history of anxiety, depression PTED w/ facial droop. NIH initially 4, symptoms resolved. No TNK given. In CDU, plan for MRI and TTE, pending reads. Will cont neuro checks, pt/ot eval and reassess.     *The above represents an initial assessment/impression. Please refer to progress notes for potential changes in patient clinical course*

## 2024-09-06 NOTE — OCCUPATIONAL THERAPY INITIAL EVALUATION ADULT - PHYSICAL ASSIST/NONPHYSICAL ASSIST:TOILET, OT EVAL
Patient Specific Otc Recommendations (Will Not Stick From Patient To Patient): Follow instructions on information sheet\\nTreatment of Common Skin Spots\\nThis explains how to use Lactic and Salicylic acid to treat SKs Detail Level: Zone verbal cues

## 2024-09-06 NOTE — ED CDU PROVIDER DISPOSITION NOTE - ATTENDING CONTRIBUTION TO CARE
Attending MD Hartman: I personally made/approved the management plan and take responsibility for the patient management.    Patient is a 79-year-old female with past medical history of anxiety, depression who presented to the ED with acute onset of nausea, headache, dizziness, left facial droop, last known well 5:30 PM.  Patient took Xanax and ibuprofen with minimal relief.  In ED,  NIHSS initially on arrival was 4, but then after CT, her score improved to 0 and no TNK was administered. Labs-nonactionable. CT also non-actionable. Asymptomatic at this time. Plan for CDU for neuro checks, pt/ot, mri and tte.     CONSTITUTIONAL: No fevers, no chills, no lightheadedness, no dizziness  EYES: no visual changes, no eye pain  EARS: no ear drainage, no ear pain, no change in hearing  NOSE: no nasal congestion  MOUTH/THROAT: no sore throat  CV: No chest pain, no palpitations  RESP: No SOB, no cough  GI: No n/v/d, no abd pain  : no dysuria, no hematuria, no flank pain  MSK: no back pain, no extremity pain  SKIN: no rashes  NEURO: no headache, no focal weakness, no decreased sensation/parasthesias   PSYCHIATRIC: no known mental health issues    General: Alert and Orientated x 3. No apparent distress.  Head: Normocephalic and atraumatic.  Eyes: PERRLA with EOMI.  Neck: Supple. Trachea midline.   Cardiac: Normal S1 and S2 w/ RRR. No murmurs appreciated. No JVD appreciated.  Pulmonary: CTA bilaterally. No increased WOB. No wheezes or crackles.  Abdominal: Soft, non-tender. (+) bowel sounds appreciated in all 4 quadrants. No hepatosplenomegaly.   Neurologic: cn2-12 grossly intact, no dysmetria. gait normal.   Musculoskeletal: Strength appropriate in all 4 extremities for age with no limited ROM.  Skin: Color appropriate for race. Intact, warm, and well-perfused.  Psychiatric: Appropriate mood and affect. No apparent risk to self or others.     to be completed.   MDM: 79-year-old female with past medical history of anxiety, depression PTED w/ facial droop. NIH initially 4, symptoms resolved. No TNK given. In CDU, plan for MRI and TTE, pending reads. Will cont neuro checks, pt/ot eval and reassess.

## 2024-09-06 NOTE — PHYSICAL THERAPY INITIAL EVALUATION ADULT - FOLLOWS COMMANDS/ANSWERS QUESTIONS, REHAB EVAL
Writer attempted to call patient. Patient did not answer. PCP office number was left on  to call back. Writer will attempt again.     Writer called pt. To convey the below results:    Additional testing shows that your protein levels are normal.              Your blood sugar has been slightly high based on a hemoglobin A1 c of 6.4%.  This is sufficient to say that you have diabetes.  I recommend that appointment with the diabetic educator and dietitian.                 Please call if you have any questions.      100% of the time

## 2024-09-06 NOTE — ED CDU PROVIDER DISPOSITION NOTE - CLINICAL COURSE
79yF w/pmhx hypothyroid, remote hx breast ca presented to the ED with acute onset left sided facial droop and left sided weakness. Upon arrival, symptoms resolved. Code stroke was activated, deemed not a tPA candidate. Initially NIHSS initially on arrival was 4, but then after CT, her score improved to 0, and so no tPA was administered. Labs reviewed, no gross metabolic or electrolytes disturbance, CT brain shows no acute intracranial findings. Non focal neuro exam. Neuro recs appreciated. CDU plan for MRI brain, TTE, and neuro checks. MRI brain resulted without evidence of acute infarct, echo ___. Pt reassessed and remains asymptomatic. Neuro recommending ___. At time of discharge pt is well appearing, vitals stable, no complaints, she will return to the ER with any worsening or concerning symptoms.   Patient is resting comfortably, in no acute distress, neuroexam remains nonfocal.  Continue current CDU plan. 79yF w/pmhx hypothyroid, remote hx breast ca presented to the ED with acute onset left sided facial droop and left sided weakness. Upon arrival, symptoms resolved. Code stroke was activated, deemed not a tPA candidate. Initially NIHSS initially on arrival was 4, but then after CT, her score improved to 0, and so no tPA was administered. Labs reviewed, no gross metabolic or electrolytes disturbance, CT brain shows no acute intracranial findings. Non focal neuro exam. Neuro recs appreciated. CDU plan for MRI brain, TTE, and neuro checks. MRI brain resulted without evidence of acute infarct, echo resulted without acute findings. Pt reassessed and remains asymptomatic. Neuro recommending outpatient follow up, aspirin 81mg daily and plavix 75mg for 3 weeks. At time of discharge pt is well appearing, vitals stable, no complaints, she will return to the ER with any worsening or concerning symptoms.   Patient is resting comfortably, in no acute distress, neuroexam remains nonfocal.  Continue current CDU plan.

## 2024-09-06 NOTE — ED CDU PROVIDER DISPOSITION NOTE - PATIENT PORTAL LINK FT
You can access the FollowMyHealth Patient Portal offered by [facility] by registering at the following website: [email or web address]. By joining [employer]’s FollowMyHealth portal, you will also be able to view your health information using other applications (apps) compatible with our system.

## 2024-09-06 NOTE — PROGRESS NOTE ADULT - ASSESSMENT
79yF w/pmhx hypothyroid, remote hx breast ca presented to the ED with acute onset left sided facial droop and left sided weakness. Upon arrival, symptoms resolved. Code stroke was activated, deemed not a tPA candidate. Initially NIHSS initially on arrival was 4, but then after CT, her score improved to 0, and so no tPA was administered. Labs reviewed, no gross metabolic or electrolytes disturbance, CT brain shows no acute intracranial findings. Non focal neuro exam. Neuro recs appreciated. CDU plan for MRI brain, TTE, and neuro checks. MRI brain resulted without evidence of acute infarct, echo resulted without acute findings. Pt reassessed and remains asymptomatic. Neuro recommending outpatient follow up, aspirin 81mg daily and plavix 75mg for 3 weeks. At time of discharge pt is well appearing, vitals stable, no complaints

## 2024-09-06 NOTE — OCCUPATIONAL THERAPY INITIAL EVALUATION ADULT - PERTINENT HX OF CURRENT PROBLEM, REHAB EVAL
As per H&P: "79y (1945) woman with a PMHx significant for Anxiety/depression presenting with sudden onset dizziness, nausea, left facial numbness and left sided weakness. Around 5pm she took 4 ibuprofen for shoulder pain and knee pain which is not abnormal for her. She also took 5mg of xanax for anxiety.  Patient reported never experienced these symptoms. Family reported she ate minimally today and has been under a lot of stress at home. LKW 5:30pm seen by daughter. Patient reports around 6:30pm she had sudden onset of room spinning sensation, fell to the floor, denies head strike. She had numbness in left face and nausea. EMS was called. In the field BP was reported to SBP in 200s. Left facial droop and left sided weakness noted. NIHSS 4 for left facial droop that did not resolve with smiling, left facial numbness, left leg weakness (some effort against gravity). After CT patient vomited. Once in the trauma room all symptoms resolved. NIHSS improved to 0. Patient's daughter and  were at bedside with grandson on the phone who is a neurosurgeon. Case and imaging findings were discussed to push Tenecteplase but since symptoms resolved we decided not to push."   Admit dx: Code stroke    MRI Head: "No evidence of infarction. 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

## 2024-09-06 NOTE — ED CDU PROVIDER SUBSEQUENT DAY NOTE - CLINICAL SUMMARY MEDICAL DECISION MAKING FREE TEXT BOX
Pt presented to the ED with acute onset of left sided facial droop and left sided weakness. Upon arrival, symptoms resolved. Code stroke was activated, deemed not a tPA candidate. Initially NIHSS initially on arrival was 4, but then after CT, her score improved to 0, and so no tPA was administered. Labs reviewed, no gross metabolic or electrolytes disturbance, CT brain shows no acute intracranial findings. Upon my evaluation, she reports feeling better, her neuro exam is non-focal. Neuro recs appreciated. CDU plan for MRI brain, TTE, and neuro checks.  Patient is resting comfortably, in no acute distress, neuroexam remains nonfocal.  Continue current CDU plan.

## 2024-09-06 NOTE — PHYSICAL THERAPY INITIAL EVALUATION ADULT - PERTINENT HX OF CURRENT PROBLEM, REHAB EVAL
Patient is a 79 year old Female, PMH stated below, presents with sudden onset dizziness, nausea, left sided weakness. Neuro exam was normal, CTH and CTP was unremarkable. Due to rapid resolution of symptoms and discussion with family tenecteplase was not given. Pending MRI w/o contrast to look at extent and distribution of stroke.

## 2024-09-06 NOTE — ED CDU PROVIDER DISPOSITION NOTE - CARE PROVIDER_API CALL
Sapna Ruiz  Neurology  [address], [address]  [place] [address]  Phone: [phone]  Fax: [phone]  Follow Up Time:

## 2024-09-06 NOTE — PHYSICAL THERAPY INITIAL EVALUATION ADULT - ADDITIONAL COMMENTS
Pt left semisupine in bed in NAD, all lines intact, call bell in reach, SPO2 97%, bed at lowest level/bed rails raised, and RN aware.

## 2024-09-15 ENCOUNTER — EMERGENCY (EMERGENCY)
Facility: HOSPITAL | Age: 79
LOS: 1 days | Discharge: HOME | End: 2024-09-15
Attending: EMERGENCY MEDICINE | Admitting: EMERGENCY MEDICINE
Payer: MEDICARE

## 2024-09-15 VITALS
WEIGHT: 128.09 LBS | DIASTOLIC BLOOD PRESSURE: 99 MMHG | OXYGEN SATURATION: 98 % | TEMPERATURE: 97 F | HEART RATE: 75 BPM | SYSTOLIC BLOOD PRESSURE: 202 MMHG | RESPIRATION RATE: 15 BRPM | HEIGHT: 62 IN

## 2024-09-15 LAB
APPEARANCE UR: CLEAR — SIGNIFICANT CHANGE UP
BACTERIA # UR AUTO: NEGATIVE /HPF — SIGNIFICANT CHANGE UP
BASOPHILS # BLD AUTO: 0.07 K/UL — SIGNIFICANT CHANGE UP (ref 0–0.2)
BASOPHILS NFR BLD AUTO: 0.9 % — SIGNIFICANT CHANGE UP (ref 0–2)
BILIRUB UR-MCNC: NEGATIVE — SIGNIFICANT CHANGE UP
CAST: 0 /LPF — SIGNIFICANT CHANGE UP (ref 0–4)
COLOR SPEC: YELLOW — SIGNIFICANT CHANGE UP
DIFF PNL FLD: ABNORMAL
EOSINOPHIL # BLD AUTO: 0.23 K/UL — SIGNIFICANT CHANGE UP (ref 0–0.5)
EOSINOPHIL NFR BLD AUTO: 3.1 % — SIGNIFICANT CHANGE UP (ref 0–6)
GLUCOSE UR QL: NEGATIVE MG/DL — SIGNIFICANT CHANGE UP
HCT VFR BLD CALC: 37.8 % — SIGNIFICANT CHANGE UP (ref 34.5–45)
HGB BLD-MCNC: 12.5 G/DL — SIGNIFICANT CHANGE UP (ref 11.5–15.5)
IANC: 4.83 K/UL — SIGNIFICANT CHANGE UP (ref 1.8–7.4)
IMM GRANULOCYTES NFR BLD AUTO: 0.4 % — SIGNIFICANT CHANGE UP (ref 0–0.9)
KETONES UR-MCNC: NEGATIVE MG/DL — SIGNIFICANT CHANGE UP
LEUKOCYTE ESTERASE UR-ACNC: NEGATIVE — SIGNIFICANT CHANGE UP
LYMPHOCYTES # BLD AUTO: 1.89 K/UL — SIGNIFICANT CHANGE UP (ref 1–3.3)
LYMPHOCYTES # BLD AUTO: 25.3 % — SIGNIFICANT CHANGE UP (ref 13–44)
MCHC RBC-ENTMCNC: 27.2 PG — SIGNIFICANT CHANGE UP (ref 27–34)
MCHC RBC-ENTMCNC: 33.1 GM/DL — SIGNIFICANT CHANGE UP (ref 32–36)
MCV RBC AUTO: 82.4 FL — SIGNIFICANT CHANGE UP (ref 80–100)
MONOCYTES # BLD AUTO: 0.42 K/UL — SIGNIFICANT CHANGE UP (ref 0–0.9)
MONOCYTES NFR BLD AUTO: 5.6 % — SIGNIFICANT CHANGE UP (ref 2–14)
NEUTROPHILS # BLD AUTO: 4.83 K/UL — SIGNIFICANT CHANGE UP (ref 1.8–7.4)
NEUTROPHILS NFR BLD AUTO: 64.7 % — SIGNIFICANT CHANGE UP (ref 43–77)
NITRITE UR-MCNC: NEGATIVE — SIGNIFICANT CHANGE UP
NRBC # BLD: 0 /100 WBCS — SIGNIFICANT CHANGE UP (ref 0–0)
NRBC # FLD: 0 K/UL — SIGNIFICANT CHANGE UP (ref 0–0)
PH UR: 7.5 — SIGNIFICANT CHANGE UP (ref 5–8)
PLATELET # BLD AUTO: 197 K/UL — SIGNIFICANT CHANGE UP (ref 150–400)
PROT UR-MCNC: NEGATIVE MG/DL — SIGNIFICANT CHANGE UP
RBC # BLD: 4.59 M/UL — SIGNIFICANT CHANGE UP (ref 3.8–5.2)
RBC # FLD: 14.9 % — HIGH (ref 10.3–14.5)
RBC CASTS # UR COMP ASSIST: 6 /HPF — HIGH (ref 0–4)
SP GR SPEC: 1.01 — SIGNIFICANT CHANGE UP (ref 1–1.03)
SQUAMOUS # UR AUTO: 0 /HPF — SIGNIFICANT CHANGE UP (ref 0–5)
UROBILINOGEN FLD QL: 0.2 MG/DL — SIGNIFICANT CHANGE UP (ref 0.2–1)
WBC # BLD: 7.47 K/UL — SIGNIFICANT CHANGE UP (ref 3.8–10.5)
WBC # FLD AUTO: 7.47 K/UL — SIGNIFICANT CHANGE UP (ref 3.8–10.5)
WBC UR QL: 2 /HPF — SIGNIFICANT CHANGE UP (ref 0–5)

## 2024-09-15 PROCEDURE — 99284 EMERGENCY DEPT VISIT MOD MDM: CPT | Mod: GC

## 2024-09-15 PROCEDURE — 93010 ELECTROCARDIOGRAM REPORT: CPT

## 2024-09-15 RX ORDER — MECLIZINE HYDROCHLORIDE 25 MG/1
25 TABLET ORAL ONCE
Refills: 0 | Status: COMPLETED | OUTPATIENT
Start: 2024-09-15 | End: 2024-09-15

## 2024-09-15 RX ORDER — ACETAMINOPHEN 325 MG/1
650 TABLET ORAL ONCE
Refills: 0 | Status: COMPLETED | OUTPATIENT
Start: 2024-09-15 | End: 2024-09-15

## 2024-09-15 RX ORDER — SODIUM CHLORIDE 9 MG/ML
1000 INJECTION INTRAMUSCULAR; INTRAVENOUS; SUBCUTANEOUS ONCE
Refills: 0 | Status: COMPLETED | OUTPATIENT
Start: 2024-09-15 | End: 2024-09-15

## 2024-09-15 RX ORDER — METOCLOPRAMIDE HCL 5 MG
10 TABLET ORAL ONCE
Refills: 0 | Status: COMPLETED | OUTPATIENT
Start: 2024-09-15 | End: 2024-09-15

## 2024-09-15 RX ADMIN — ACETAMINOPHEN 650 MILLIGRAM(S): 325 TABLET ORAL at 23:05

## 2024-09-15 RX ADMIN — Medication 10 MILLIGRAM(S): at 23:17

## 2024-09-15 RX ADMIN — SODIUM CHLORIDE 1000 MILLILITER(S): 9 INJECTION INTRAMUSCULAR; INTRAVENOUS; SUBCUTANEOUS at 23:05

## 2024-09-15 NOTE — ED ADULT NURSE NOTE - CHIEF COMPLAINT QUOTE
C/O headaches and dizziness intermittently x10 days. worsening at 12pm today. No complaints of chest pain, , nausea,  vomiting  SOB, fever, chills verbalized. Phx HTN

## 2024-09-15 NOTE — ED PROVIDER NOTE - ATTENDING CONTRIBUTION TO CARE
Dr. Solorio:  I have personally performed a face to face bedside history and physical examination of this patient. I have discussed the history, examination, review of systems, assessment and plan of management with the resident. I have reviewed the electronic medical record and amended it to reflect my history, review of systems, physical exam, assessment and plan.    69F h/o hypothyroidism presents with headache and vertigo symptoms today, associated with elevated BP to systolic 200's.  Pt had similar symptoms two weeks ago, (HA, left deficits at that time), was seen and had negative CTA & MRI of head.  Discharged with ASA/plavix and neuro follow up.  Pt states symptoms had improved today by the time she arrived in ED, but experiences dizziness/room spinning again when she went to use the restroom while in the ED.      Exam:   - nad  - rrr  - ctab  - abd soft ntnd  - +fatiguable left horizontal nystagmus, no other focal neuro deficits, steady gait    A/P  - headache and vertigo, possibly secondary to blood pressure vs peripheral vertigo vs complex migraine  - basic labs, trop  - meds and reassess

## 2024-09-15 NOTE — ED PROVIDER NOTE - CLINICAL SUMMARY MEDICAL DECISION MAKING FREE TEXT BOX
80 y/o F presenting s/p episode of  severe headache and room spinning dizziness lasting approx 2 hours, patient's son states that she was unresponsive for several seconds during episode.  Patient was seen for similar symptoms with additional sensory deficits approximately 2 weeks ago and had full negative stroke workup.  Patient was started on ASA and Plavix and has been taking them since discharge.  Patient's son neurosurgeon and states he was concerned about hypertensive encephalopathy, he prescribed her losartan 50 mg daily, now on day 2. Bps have been in 130s systolic since dc.  Taking ibuprofen 800 mg every 4 hours daily for MSK pain. No known history of migraines. No visual changes, fevers, chest pain, palpitations, abd pain, n/v/d, dysuria, muscle cramping, focal weakness. No neuro deficits on exam. The differential diagnosis includes but is not limited to TIA vs migraine vs electrolyte abnormality. Will order CBC, CMP, mag/phos, EKG/trop, UA. Likely DC home with close f.up.

## 2024-09-15 NOTE — ED ADULT NURSE NOTE - OBJECTIVE STATEMENT
Pt received to room 8, A&Ox4, ambulatory. Pt presenting to the Ed Pt received to room 8, A&Ox4, ambulatory. Pt presenting to the ED complaining of headache and dizziness that worsened around 12 pm today. P Pt received to room 8, A&Ox4, ambulatory. Pt presenting to the ED complaining of headache and dizziness that worsened around 12 pm today. Pt recently seen in the ED for similar complaints. Pt denies c/p, SOB, n/v/d, headache, abd pain, fever like symptoms. 20G IV placed in the left AC, labs drawn and sent. Pt medicated as per MD orders. Respirations even and unlabored. Comfort measures provide, safety maintained. Plan of care ongoing.

## 2024-09-15 NOTE — ED PROVIDER NOTE - PROGRESS NOTE DETAILS
Ahmet Barrera PGY3:  Spoke with Pt and Pt's son who is a neurosurgeon. They are agreeable with plan with holding off of imaging at this time and treating symptoms and revealing. Ahmet Barrera PGY3:  Reevaluated Pt by bedside. Updated Pt on labs and imaging. Pt states she feels much better. Ambulatory without assistance. Answered all questions. Reviewed return precautions. Pt expressed understanding and feels comfortable with plan for DC.

## 2024-09-15 NOTE — ED ADULT NURSE NOTE - NSFALLUNIVINTERV_ED_ALL_ED
Bed/Stretcher in lowest position, wheels locked, appropriate side rails in place/Call bell, personal items and telephone in reach/Instruct patient to call for assistance before getting out of bed/chair/stretcher/Non-slip footwear applied when patient is off stretcher/[place] to call system/Physically safe environment - no spills, clutter or unnecessary equipment/Purposeful proactive rounding/Room/bathroom lighting operational, light cord in reach

## 2024-09-15 NOTE — ED PROVIDER NOTE - NSCAREINITIATED _GEN_ER
Called Kimberley and relayed Dr. Pickering's recommendation that it is okay to us Dexamethasone patch on her ankle. Pt verbalized understanding.     Kisha Ching RN    
I called Kimberley back regarding her message. She also was checking with her PCP on whether she could take this dexamethasone patch for there tendonitis in her ankle. She wanted to make sure it does not interact with her other medications.    She is also wondering about metoprolol tartrate and whether she needs to take this twice a day. I explained to her that it is a shorter acting medication and should be taken twice a day.     Aliya Victor RN        
I called Kimberley back regarding her question. Per Dr. Pickering, it is okay for her to use a dexamethosone patch for her ankle.    Aliya Victor RN    
[facility] Call Center    Phone Message    May a detailed message be left on voicemail: no     Reason for Call: Other: Pt, Kimberley would like to know if she can take a patch with Dexamethosone in it for her Tendonitis in her Ankle.  Please call Kimberley at: [phone]     Action Taken: Message routed to:  [facility] ([facility] Cardiology    Travel Screening: Not Applicable                                                                        
Yes, that should be fine.    Thanks,  Justin Pickering MD    
Hilda Burdick(Resident)

## 2024-09-15 NOTE — ED PROVIDER NOTE - PHYSICAL EXAMINATION
Gen: NAD, non-toxic appearing  Head: NCAT  HEENT: normal conjunctiva, PERRLA, EOMI, oral mucosa moist  Lung: no respiratory distress, speaking in full sentences, CTA b/l     CV: regular rate and rhythm, no murmurs  Abd: soft, non distended, non tender   MSK: no visible deformities  Neuro: No focal deficits, AAOx3, no dysmetria with finger to nose, no pronator drift, no gait abnormalities  Skin: Warm  Psych: normal affect

## 2024-09-15 NOTE — ED PROVIDER NOTE - OBJECTIVE STATEMENT
78 y/o F presenting s/p episode of headache, dizziness today. Patient states that while at home she began having severe headache, felt room spinning dizziness patient's son states that she was unresponsive for several seconds.  Headache and dizziness lasted about 2 hours and then resolved without treatment. Pt son at bedside and noted high BP during episode, systolics in 200s. Patient was seen for similar symptoms with additional sensory deficits approximately 2 weeks ago and had full negative stroke workup.  Patient was started on ASA and Plavix and has been taking them since discharge.  Patient followed up with PCP after discharge was not started on any new meds by PCP. Checking BPs at home bid since DC with systolics in 130s.  Patient's son neurosurgeon and states he was concerned about hypertensive encephalopathy, he prescribed her losartan 50 mg daily, now on day 2.  Patient endorses history of chronic C2 fracture leading to frequent headaches, however this headache is worse. Also with frequent right shoulder pain, taking ibuprofen 800 mg every 4 hours daily.  Last dose 12 PM. No visual changes, fevers, chest pain, palpitations, abd pain, n/v/d, dysuria, muscle cramping, focal weakness.

## 2024-09-15 NOTE — ED PROVIDER NOTE - NSFOLLOWUPINSTRUCTIONS_ED_ALL_ED_FT
Please follow up with your Neurologist on Thursday as planned.     Dizziness    Dizziness can manifest as a feeling of unsteadiness or light-headedness. You may feel like you are about to faint. This condition can be caused by a number of things, including medicines, dehydration, or illness. Drink enough fluid to keep your urine clear or pale yellow. Do not drink alcohol and limit your caffeine intake. Avoid quick or sudden movements.  Rise slowly from chairs and steady yourself until you feel okay. In the morning, first sit up on the side of the bed.    SEEK IMMEDIATE MEDICAL CARE IF YOU HAVE ANY OF THE FOLLOWING SYMPTOMS: vomiting, changes in your vision or speech, weakness in your arms or legs, trouble speaking or swallowing, chest pain, abdominal pain, shortness of breath, sweating, bleeding, headache, neck pain, or fever.

## 2024-09-15 NOTE — ED PROVIDER NOTE - PATIENT PORTAL LINK FT
You can access the FollowMyHealth Patient Portal offered by [facility] by registering at the following website: [email or web address]. By joining [employer]’s FollowMyHealth portal, you will also be able to view your health information using other applications (apps) compatible with our system.

## 2024-09-16 ENCOUNTER — APPOINTMENT (OUTPATIENT)
Dept: ORTHOPEDIC SURGERY | Facility: CLINIC | Age: 79
End: 2024-09-16
Payer: MEDICARE

## 2024-09-16 VITALS
OXYGEN SATURATION: 96 % | TEMPERATURE: 98 F | SYSTOLIC BLOOD PRESSURE: 153 MMHG | HEART RATE: 72 BPM | DIASTOLIC BLOOD PRESSURE: 87 MMHG | RESPIRATION RATE: 16 BRPM

## 2024-09-16 VITALS
HEART RATE: 78 BPM | SYSTOLIC BLOOD PRESSURE: 129 MMHG | HEIGHT: 59 IN | BODY MASS INDEX: 25.8 KG/M2 | WEIGHT: 128 LBS | DIASTOLIC BLOOD PRESSURE: 80 MMHG

## 2024-09-16 DIAGNOSIS — M19.011 PRIMARY OSTEOARTHRITIS, RIGHT SHOULDER: ICD-10-CM

## 2024-09-16 LAB
ALBUMIN SERPL ELPH-MCNC: 4 G/DL — SIGNIFICANT CHANGE UP (ref 3.3–5)
ALP SERPL-CCNC: 73 U/L — SIGNIFICANT CHANGE UP (ref 40–120)
ALT FLD-CCNC: 22 U/L — SIGNIFICANT CHANGE UP (ref 4–33)
ANION GAP SERPL CALC-SCNC: 13 MMOL/L — SIGNIFICANT CHANGE UP (ref 7–14)
APTT BLD: 30.5 SEC — SIGNIFICANT CHANGE UP (ref 24.5–35.6)
AST SERPL-CCNC: 31 U/L — SIGNIFICANT CHANGE UP (ref 4–32)
BILIRUB SERPL-MCNC: 0.2 MG/DL — SIGNIFICANT CHANGE UP (ref 0.2–1.2)
BUN SERPL-MCNC: 16 MG/DL — SIGNIFICANT CHANGE UP (ref 7–23)
CALCIUM SERPL-MCNC: 8.4 MG/DL — SIGNIFICANT CHANGE UP (ref 8.4–10.5)
CHLORIDE SERPL-SCNC: 100 MMOL/L — SIGNIFICANT CHANGE UP (ref 98–107)
CO2 SERPL-SCNC: 26 MMOL/L — SIGNIFICANT CHANGE UP (ref 22–31)
CREAT SERPL-MCNC: 0.73 MG/DL — SIGNIFICANT CHANGE UP (ref 0.5–1.3)
EGFR: 84 ML/MIN/1.73M2 — SIGNIFICANT CHANGE UP
GLUCOSE SERPL-MCNC: 126 MG/DL — HIGH (ref 70–99)
INR BLD: 1.06 RATIO — SIGNIFICANT CHANGE UP (ref 0.85–1.18)
MAGNESIUM SERPL-MCNC: 2 MG/DL — SIGNIFICANT CHANGE UP (ref 1.6–2.6)
PHOSPHATE SERPL-MCNC: 3.2 MG/DL — SIGNIFICANT CHANGE UP (ref 2.5–4.5)
POTASSIUM SERPL-MCNC: 3.4 MMOL/L — LOW (ref 3.5–5.3)
POTASSIUM SERPL-SCNC: 3.4 MMOL/L — LOW (ref 3.5–5.3)
PROT SERPL-MCNC: 6.6 G/DL — SIGNIFICANT CHANGE UP (ref 6–8.3)
PROTHROM AB SERPL-ACNC: 11.9 SEC — SIGNIFICANT CHANGE UP (ref 9.5–13)
SODIUM SERPL-SCNC: 139 MMOL/L — SIGNIFICANT CHANGE UP (ref 135–145)
TROPONIN T, HIGH SENSITIVITY RESULT: 7 NG/L — SIGNIFICANT CHANGE UP

## 2024-09-16 PROCEDURE — 99213 OFFICE O/P EST LOW 20 MIN: CPT | Mod: 25

## 2024-09-16 PROCEDURE — 20610 DRAIN/INJ JOINT/BURSA W/O US: CPT | Mod: RT

## 2024-09-16 NOTE — DISCUSSION/SUMMARY
[de-identified] : The patient has osteoarthritis of the right shoulder.  She has not had relief from physical therapy.  She is not tolerating NSAIDs as her blood pressure is elevated.  Treatment options were discussed.  She is interested in trying corticosteroid injection. Informed consent is obtained. Site and procedure were confirmed with the patient. Following a sterile prep with alcohol an injection is placed into the glenohumeral joint of the right shoulder. The injection consists of 1 cc of Depo-Medrol 40 mg/cc and 8 cc of 1% Xylocaine. The injection was well tolerated. Instructions were given. She will return as needed.

## 2024-09-16 NOTE — HISTORY OF PRESENT ILLNESS
[de-identified] : Patient presents for reevaluation of right shoulder pain. She did PT for 2 months without much improvement. She has difficulty with brushing her hair and bathing. She has been taking Advil with mild relief. She is interested in a cortisone injection for shoulder today.

## 2024-09-16 NOTE — PHYSICAL EXAM
[Slightly Antalgic] : slightly antalgic [Rad] : radial 2+ and symmetric bilaterally [Normal] : Alert and in no acute distress [Poor Appearance] : well-appearing [Acute Distress] : not in acute distress [Obese] : not obese [de-identified] : The patient has no respiratory distress. Mood and affect are normal. The patient is alert and oriented to person, place and time. Examination of the cervical spine demonstrates no tenderness, no deformity and no muscle spasm. Cervical spine rotation is 60 to the right, 60 to the left, 75 of extension and 45 of flexion. Neurologic exam of the upper extremities reveals intact sensation to light touch. Motor function is 5 over 5 in all groups. Deep tendon reflexes are 2+ and equal at the biceps, triceps and brachioradialis. Examination of the right shoulder demonstrates no deformity. The skin is intact. There is no erythema. There is tenderness anteriorly. Impingement sign is positive There is no instability. Drop arm test is negative. Empty can test is negative. Liftoff test is negative. [place] test is negative.  The patient has elevation of 100 degrees and internal rotation to the lower lumbar level.  The elbow is stable.  There is no lymphedema.

## 2025-03-13 ENCOUNTER — APPOINTMENT (OUTPATIENT)
Dept: PAIN MANAGEMENT | Facility: CLINIC | Age: 80
End: 2025-03-13

## 2025-03-13 DIAGNOSIS — M54.16 RADICULOPATHY, LUMBAR REGION: ICD-10-CM

## 2025-03-13 DIAGNOSIS — M47.816 SPONDYLOSIS W/OUT MYELOPATHY OR RADICULOPATHY, LUMBAR REGION: ICD-10-CM

## 2025-03-13 DIAGNOSIS — M51.26 OTHER INTERVERTEBRAL DISC DISPLACEMENT, LUMBAR REGION: ICD-10-CM

## 2025-03-21 ENCOUNTER — APPOINTMENT (OUTPATIENT)
Dept: ORTHOPEDIC SURGERY | Facility: CLINIC | Age: 80
End: 2025-03-21

## 2025-03-24 ENCOUNTER — APPOINTMENT (OUTPATIENT)
Dept: ORTHOPEDIC SURGERY | Facility: CLINIC | Age: 80
End: 2025-03-24
Payer: MEDICARE

## 2025-03-24 VITALS — BODY MASS INDEX: 26.21 KG/M2 | WEIGHT: 130 LBS | HEIGHT: 59 IN

## 2025-03-24 DIAGNOSIS — S83.242A OTHER TEAR OF MEDIAL MENISCUS, CURRENT INJURY, LEFT KNEE, INITIAL ENCOUNTER: ICD-10-CM

## 2025-03-24 DIAGNOSIS — M17.12 UNILATERAL PRIMARY OSTEOARTHRITIS, LEFT KNEE: ICD-10-CM

## 2025-03-24 DIAGNOSIS — S83.282A OTHER TEAR OF LATERAL MENISCUS, CURRENT INJURY, LEFT KNEE, INITIAL ENCOUNTER: ICD-10-CM

## 2025-03-24 DIAGNOSIS — M65.962 UNSPECIFIED SYNOVITIS AND TENOSYNOVITIS, LEFT LOWER LEG: ICD-10-CM

## 2025-03-24 PROCEDURE — 99203 OFFICE O/P NEW LOW 30 MIN: CPT

## 2025-03-24 RX ORDER — METHYLPREDNISOLONE 4 MG/1
4 TABLET ORAL
Qty: 1 | Refills: 0 | Status: ACTIVE | COMMUNITY
Start: 2025-03-24 | End: 1900-01-01

## 2025-05-05 ENCOUNTER — APPOINTMENT (OUTPATIENT)
Dept: ORTHOPEDIC SURGERY | Facility: CLINIC | Age: 80
End: 2025-05-05

## 2025-06-03 NOTE — ED ADULT TRIAGE NOTE - WEIGHT IN KG
[0] : 2) Feeling down, depressed, or hopeless: Not at all (0) [Never] : Never [Patient/Caregiver unclear of wishes] : Patient/Caregiver unclear of wishes [PHQ-2 Negative] : PHQ-2 Negative [[id number]] : 0 [Date Discussed (MM/DD/YY): ___] : Discussed: [unfilled] 57.6

## 2025-06-04 NOTE — PATIENT PROFILE ADULT. - EXTENSIONS OF SELF_ADULT
Copied from CRM #[id number]. Topic: MW Messaging - MW Patient Request  >> Jun 4, 2025  9:47 AM Asiya PHILLIPS wrote:  --DO NOT REPLY - Sent from PACT - If sent to wrong pool, reroute to P [facility] Reroute pool --    Reason for Appointment Message: Sooner appointment   Visit Request: Sooner appointment than what was offered   Message: Patient requesting appointment for form completeion/can be vv   Preferred time to be seen: asap  Callback #: [id number]  Can a detailed message be left? Yes - Voicemail   Caller has been advised this message will be addressed within:2-3 business days [routine]    
Patient scheduled for 6/10/25  
Eyeglasses

## 2025-07-28 ENCOUNTER — OUTPATIENT (OUTPATIENT)
Dept: OUTPATIENT SERVICES | Facility: HOSPITAL | Age: 80
LOS: 1 days | Discharge: ANOTHER HOSPITAL | End: 2025-07-28
Payer: MEDICARE

## 2025-07-28 PROCEDURE — 90833 PSYTX W PT W E/M 30 MIN: CPT

## 2025-07-28 PROCEDURE — 99204 OFFICE O/P NEW MOD 45 MIN: CPT

## 2025-07-28 PROCEDURE — 90839 PSYTX CRISIS INITIAL 60 MIN: CPT

## 2025-07-30 DIAGNOSIS — F33.9 MAJOR DEPRESSIVE DISORDER, RECURRENT, UNSPECIFIED: ICD-10-CM
